# Patient Record
Sex: MALE | Race: WHITE | Employment: OTHER | URBAN - METROPOLITAN AREA
[De-identification: names, ages, dates, MRNs, and addresses within clinical notes are randomized per-mention and may not be internally consistent; named-entity substitution may affect disease eponyms.]

---

## 2023-06-15 ENCOUNTER — TELEPHONE (OUTPATIENT)
Dept: ORTHOPEDIC SURGERY | Age: 68
End: 2023-06-15

## 2023-06-15 NOTE — TELEPHONE ENCOUNTER
General Question     Subject: 2ND OPINION FROM [place]  Patient and /or Facility Request: Chey Woods Number: [phone]      THIS PT IS COMING FOR A 2ND OPINION FROM [place]. THEY ARE FAXING HIS RECORDS AND HE WILL BE BRINGING A DISC OF THE IMAGING WITH HIM. Angel Reyes [phone]. CAN YOU MAIL HIM ANY FORMS OR DIRECTIONS HE NEEDS TO HAVE PRIOR TO THE APPT PLEASE.

## 2023-07-11 ENCOUNTER — OFFICE VISIT (OUTPATIENT)
Dept: ORTHOPEDIC SURGERY | Age: 68
End: 2023-07-11

## 2023-07-11 VITALS — BODY MASS INDEX: 31.1 KG/M2 | WEIGHT: 210 LBS | HEIGHT: 69 IN

## 2023-07-11 DIAGNOSIS — T84.82XD ARTHROFIBROSIS OF TOTAL KNEE ARTHROPLASTY, SUBSEQUENT ENCOUNTER: Primary | ICD-10-CM

## 2023-07-11 RX ORDER — SOTALOL HYDROCHLORIDE 120 MG/1
120 TABLET ORAL 2 TIMES DAILY
COMMUNITY
Start: 2023-06-17

## 2023-07-11 RX ORDER — AMLODIPINE BESYLATE 5 MG/1
5 TABLET ORAL DAILY
COMMUNITY
Start: 2018-12-20

## 2023-07-11 RX ORDER — APIXABAN 5 MG/1
5 TABLET, FILM COATED ORAL 2 TIMES DAILY
COMMUNITY
Start: 2023-06-17

## 2023-07-11 RX ORDER — VALSARTAN AND HYDROCHLOROTHIAZIDE 160; 12.5 MG/1; MG/1
1 TABLET, FILM COATED ORAL DAILY
COMMUNITY

## 2023-07-11 RX ORDER — EVOLOCUMAB 140 MG/ML
INJECTION, SOLUTION SUBCUTANEOUS
COMMUNITY

## 2023-07-11 NOTE — PROGRESS NOTES
options and have  reviewed and agree with the past medical, family and social history unless otherwise noted. All of the patient's questions were answered. I personally supervised the Orthopedic and Sportsmedicine Fellow when when noted in the evaluation and treatment plan of the patient.       Board Certified Orthopaedic Surgeon  [facility] and [facility]  President and [facility]  Professor of [address] [place] [address], [address] [place] [address]

## 2023-07-12 ENCOUNTER — TELEPHONE (OUTPATIENT)
Dept: ORTHOPEDIC SURGERY | Age: 68
End: 2023-07-12

## 2023-07-17 DIAGNOSIS — T84.82XD ARTHROFIBROSIS OF TOTAL KNEE ARTHROPLASTY, SUBSEQUENT ENCOUNTER: Primary | ICD-10-CM

## 2023-07-17 NOTE — TELEPHONE ENCOUNTER
Spoke with patient and he is willing to stay for 6 weeks for PT. Put in for surgical letter. Emailed patient paperwork.

## 2023-07-18 ENCOUNTER — TELEPHONE (OUTPATIENT)
Dept: ORTHOPEDIC SURGERY | Age: 68
End: 2023-07-18

## 2023-08-07 ENCOUNTER — TELEPHONE (OUTPATIENT)
Dept: ORTHOPEDIC SURGERY | Age: 68
End: 2023-08-07

## 2023-08-07 NOTE — TELEPHONE ENCOUNTER
Auth: NPR  Date: 08/28/23  Reference # None  Spoke with: None  Type of SX: Outpatient  Location: [facility]  CPT: 48886   DX: T84.82XS  SX area: Rt knee  Insurance: Medicare A&B

## 2023-08-09 ENCOUNTER — TELEPHONE (OUTPATIENT)
Dept: ORTHOPEDIC SURGERY | Age: 68
End: 2023-08-09

## 2023-08-09 NOTE — TELEPHONE ENCOUNTER
Faxing Patient [address] Name: [address] [place] [address],[address] Name: Lilly De La Rosa  Contact Number: [phone]  Facility Fax Number: [phone]    PT IS REQUESTING CODES TO BE SENT TO [facility] copygram TO BE FORWARDED [facility] TEST TO [address] [place] [address]. PT STATED THAT HE IS ABLE TO PAY FOR CASH FOR THE TESTS EXCEPT FOR THE PTINR-WHICH WOULD HAVE TO BE BILLED THROUGH INSURANCE. THIS WOULD BE THE ONLY CODE VIA PT THAT WOULD NEED DX CODE. PLEASE ADVISE.

## 2023-08-16 ENCOUNTER — TELEPHONE (OUTPATIENT)
Dept: ORTHOPEDIC SURGERY | Age: 68
End: 2023-08-16

## 2023-08-16 NOTE — TELEPHONE ENCOUNTER
Surgery and/or Procedure Scheduling     Contact Name: Amairani Castellanos  Surgical/Procedure Request: Sx on 08/28/23 on his knee  Patient Contact Number: [phone]    Patient call and he would like to get and call back regarding these questions. He would like to know if office got all of his paper work and labs for his surgery. 2. Patient is taken Eliquis and he just need to know if he should stop taken this for a week before his surgery. 3. He also need to know if he should stop taken his Testosterone Supplement he need to know for how long?

## 2023-08-18 NOTE — TELEPHONE ENCOUNTER
General Question     Subject: REQUESTING A CALL REGARDING PRE-OP FORMS IN ORDER AND MEDICATION QUESTIONS FOR SX.   Patient: Rosalba Hampton Number: [phone]

## 2023-08-22 ENCOUNTER — HOSPITAL ENCOUNTER (OUTPATIENT)
Dept: PHYSICAL THERAPY | Age: 68
Setting detail: THERAPIES SERIES
Discharge: HOME | End: 2023-08-22
Payer: MEDICARE

## 2023-08-22 ENCOUNTER — OFFICE VISIT (OUTPATIENT)
Dept: ORTHOPEDIC SURGERY | Age: 68
End: 2023-08-22

## 2023-08-22 VITALS — HEIGHT: 69 IN | BODY MASS INDEX: 31.1 KG/M2 | WEIGHT: 210 LBS

## 2023-08-22 DIAGNOSIS — T84.82XD ARTHROFIBROSIS OF TOTAL KNEE ARTHROPLASTY, SUBSEQUENT ENCOUNTER: Primary | ICD-10-CM

## 2023-08-22 PROCEDURE — PREOPEXAM PRE-OP EXAM: Performed by: PHYSICIAN ASSISTANT

## 2023-08-22 PROCEDURE — 97161 PT EVAL LOW COMPLEX 20 MIN: CPT | Performed by: PHYSICAL THERAPIST

## 2023-08-22 PROCEDURE — 97530 THERAPEUTIC ACTIVITIES: CPT | Performed by: PHYSICAL THERAPIST

## 2023-08-22 NOTE — FLOWSHEET NOTE
[facility] and [address] [place] [address] [address],  [facility], [address] [place] [address]  200 [facility], [address] [place] [address] Avenue  Phone: [phone]  Fax: [phone]      Physical Therapy Daily Treatment Note  Date:  2023    Patient Name:  Benjamin Velazquez    :  1955  MRN: [id number]  Restrictions/Precautions:    Medical/Treatment Diagnosis Information:  Arthrofibrosis of total knee arthroplasty, subsequent encounter [T84.82XD]  Treatment Diagnosis: M25.561 pain in right knee  Insurance/Certification information:  PT Insurance Information: Medicare  Physician Information:  Sammy Hwang   Has the plan of care been signed (Y/N):        []  Yes  [x]  No     Date of Patient follow up with Physician:       Is this a Progress Report:     []  Yes  [x]  No        If Yes:  Date Range for reporting period:  Beginning  Ending    Progress report will be due (10 Rx or 30 days whichever is less):        Recertification will be due (POC Duration  / 90 days whichever is less): 10/3/23         Visit # Insurance Allowable Auth Required   1 medicare []  Yes [x]  No          OUTCOME MEASURE DATE SCORE   LEFS 23 62% deficit            Latex Allergy:  [x]NO      []YES  Preferred Language for Healthcare:   [x]English       []other:    Pain level:  4-6/10     SUBJECTIVE:  See eval    OBJECTIVE:   Observation:   Test measurements:        Flexibility L R Comment   Hamstring         Gastroc         ITB         Quad                                ROM PROM AROM Overpressure Comment     L R L R L R     Flexion       130         Extension       0                                                   Strength L R Comment   Quad   4+/5     Hamstring   4+/5     Gastroc         Hip  flexion   4+/5     Hip abd   4+/5               Quad good good [email or web address]         Special Test Results/Comment   Meniscal Click     Crepitus (+) 90-0 pain throughout movement   Flexion Test     Valgus Laxity

## 2023-08-22 NOTE — PROGRESS NOTES
HAVE 8/25/23 DR Shonda HINKLE    Sleep apnea     CAN;T TOLERATE CPAP    SOB (shortness of breath) on exertion     \"RUNNERS ARRYTHMIA\"        Past Surgical History:   Procedure Laterality Date    BACK SURGERY      LUMBAR, PT ALSO HAD INJECTIONS, INCLUDING STEM CELL,    DENTAL SURGERY      IMPLANTS    JOINT REPLACEMENT      KNEE SURGERY Right     MENISCUS, CLEANED OUT CARTILAGE, AND IMPLANT -3 TOTAL)    SHOULDER ARTHROSCOPY Bilateral     HAS HAD 8 TOTAL SURGERIES FOR SHOULDER, ROTATOR CUFF, TENODESIS    THUMB ARTHROSCOPY      LEFT THUMB FUSION       History reviewed. No pertinent family history.     Social History     Socioeconomic History    Marital status:      Spouse name: None    Number of children: None    Years of education: None    Highest education level: None   Tobacco Use    Smoking status: Never    Smokeless tobacco: Never   Substance and Sexual Activity    Alcohol use: Yes     Comment: WINE OUT TO DINNER, BEER, AWARE NOT TO DRINK BEFORE SURGERY    Drug use: Never       Current Outpatient Medications   Medication Sig Dispense Refill    tadalafil (CIALIS) 20 MG tablet       nitroGLYCERIN (NITROSTAT) 0.4 MG SL tablet PT SAYS HAS, NEVER USED, PRN      methocarbamol (ROBAXIN) 500 MG tablet Take 1 tablet by mouth as needed      diclofenac (VOLTAREN) 75 MG EC tablet Take 1 tablet by mouth as needed      colesevelam (WELCHOL) 625 MG tablet TAKE 3 TABLETS BY MOUTH TWICE DAILY      testosterone cypionate (DEPOTESTOTERONE CYPIONATE) 200 MG/ML injection INJECT 0.5ML INTRAMUSCULARLY EVERY 7 DAYS      aspirin 81 MG EC tablet Take 1 tablet by mouth daily      amLODIPine (NORVASC) 5 MG tablet Take 1 tablet by mouth daily      ELIQUIS 5 MG TABS tablet Take 1 tablet by mouth 2 times daily      valsartan-hydroCHLOROthiazide (DIOVAN-HCT) 160-12.5 MG per tablet Take 1 tablet by mouth daily      Evolocumab (REPATHA SURECLICK) 751 MG/ML SOAJ Repatha SureClick 955 mg/mL subcutaneous pen injector   INJECT 1MG SUBCUTANEOUSLY

## 2023-08-23 ENCOUNTER — TELEPHONE (OUTPATIENT)
Dept: ORTHOPEDIC SURGERY | Age: 68
End: 2023-08-23

## 2023-08-23 RX ORDER — TESTOSTERONE CYPIONATE 200 MG/ML
INJECTION, SOLUTION INTRAMUSCULAR
COMMUNITY
Start: 2023-06-21

## 2023-08-23 RX ORDER — COLESEVELAM 180 1/1
TABLET ORAL
COMMUNITY
Start: 2023-06-21

## 2023-08-23 RX ORDER — NITROGLYCERIN 0.4 MG/1
TABLET SUBLINGUAL
COMMUNITY

## 2023-08-23 RX ORDER — METHOCARBAMOL 500 MG/1
1 TABLET, FILM COATED ORAL AS NEEDED
COMMUNITY
Start: 2019-12-10

## 2023-08-23 RX ORDER — TADALAFIL 20 MG/1
TABLET ORAL
COMMUNITY

## 2023-08-23 RX ORDER — DICLOFENAC SODIUM 75 MG/1
75 TABLET, DELAYED RELEASE ORAL PRN
COMMUNITY
Start: 2019-09-24

## 2023-08-23 RX ORDER — ASPIRIN 81 MG/1
81 TABLET ORAL DAILY
COMMUNITY

## 2023-08-23 NOTE — TELEPHONE ENCOUNTER
Follow patient in the office yesterday for his preoperative visit. Please see that note for further details. All the patient's questions were answered and he agrees with the current plan.         Alfonso Palomo PA-C    Physician Assistant - Certified  [address] [place] [address]    08/23/23 11:02 AM

## 2023-08-23 NOTE — TELEPHONE ENCOUNTER
General Question     Subject: [facility]   Patient and /or Facility Request: [place] Day Number: [phone]    [facility] CALLED IN ABOUT THE PATIENT UPCOMING [facility] ON AUGUST 28, 2023. .. NEED TO KNOW WHAT KINDS OF MEDS HE TAKE. Diana Borden AND ANTIBIOTICS HE TAKING. Diana Borden PLEASE CALL FRITZ BACK FROM [facility]. ..

## 2023-08-25 ENCOUNTER — TELEPHONE (OUTPATIENT)
Dept: ORTHOPEDIC SURGERY | Age: 68
End: 2023-08-25

## 2023-08-25 NOTE — TELEPHONE ENCOUNTER
General Question     Subject: INSURANCE CHANGE  Patient and /or Facility Request: Satya Montgomery Number: [phone]    PT CLLED SAID HIS SECONDARY INSURANCE WAS CHANGED TO [employer]  FOR DRUG COVERAGE  M# [id number]  HE WANTED TO MAKE SURE IT WOULD COVER THE DRUGS USED DURING SURGERY TRANSFERRED TO BILLING

## 2023-08-28 ENCOUNTER — ANESTHESIA (OUTPATIENT)
Dept: OPERATING ROOM | Age: 68
End: 2023-08-28
Payer: MEDICARE

## 2023-08-28 ENCOUNTER — HOSPITAL ENCOUNTER (OUTPATIENT)
Age: 68
Setting detail: OUTPATIENT SURGERY
Discharge: HOME | End: 2023-08-28
Attending: ORTHOPAEDIC SURGERY | Admitting: ORTHOPAEDIC SURGERY
Payer: MEDICARE

## 2023-08-28 ENCOUNTER — ANESTHESIA EVENT (OUTPATIENT)
Dept: OPERATING ROOM | Age: 68
End: 2023-08-28
Payer: MEDICARE

## 2023-08-28 VITALS
WEIGHT: 207 LBS | DIASTOLIC BLOOD PRESSURE: 82 MMHG | HEIGHT: 69 IN | TEMPERATURE: 97.2 F | BODY MASS INDEX: 30.66 KG/M2 | HEART RATE: 60 BPM | RESPIRATION RATE: 16 BRPM | OXYGEN SATURATION: 94 % | SYSTOLIC BLOOD PRESSURE: 132 MMHG

## 2023-08-28 DIAGNOSIS — T84.82XA ARTHROFIBROSIS OF TOTAL KNEE REPLACEMENT, INITIAL ENCOUNTER (HCC): Primary | ICD-10-CM

## 2023-08-28 LAB
APTT BLD: 23.7 SEC (ref 22.7–35.9)
DEPRECATED RDW RBC AUTO: 16.8 % (ref 12.4–15.4)
GLUCOSE BLD-MCNC: 126 MG/DL (ref 70–99)
HCT VFR BLD AUTO: 43.6 % (ref 40.5–52.5)
HGB BLD-MCNC: 15.2 G/DL (ref 13.5–17.5)
INR PPP: 0.99 (ref 0.84–1.16)
MCH RBC QN AUTO: 30.5 PG (ref 26–34)
MCHC RBC AUTO-ENTMCNC: 34.8 G/DL (ref 31–36)
MCV RBC AUTO: 87.7 FL (ref 80–100)
PERFORMED ON: ABNORMAL
PLATELET # BLD AUTO: 142 K/UL (ref 135–450)
PMV BLD AUTO: 8.9 FL (ref 5–10.5)
PROTHROMBIN TIME: 13.1 SEC (ref 11.5–14.8)
RBC # BLD AUTO: 4.97 M/UL (ref 4.2–5.9)
WBC # BLD AUTO: 3.4 K/UL (ref 4–11)

## 2023-08-28 PROCEDURE — 6360000002 HC RX W HCPCS: Performed by: ORTHOPAEDIC SURGERY

## 2023-08-28 PROCEDURE — 7100000011 HC PHASE II RECOVERY - ADDTL 15 MIN: Performed by: ORTHOPAEDIC SURGERY

## 2023-08-28 PROCEDURE — 2500000003 HC RX 250 WO HCPCS: Performed by: ORTHOPAEDIC SURGERY

## 2023-08-28 PROCEDURE — 88305 TISSUE EXAM BY PATHOLOGIST: CPT

## 2023-08-28 PROCEDURE — 87205 SMEAR GRAM STAIN: CPT

## 2023-08-28 PROCEDURE — 87116 MYCOBACTERIA CULTURE: CPT

## 2023-08-28 PROCEDURE — 3700000000 HC ANESTHESIA ATTENDED CARE: Performed by: ORTHOPAEDIC SURGERY

## 2023-08-28 PROCEDURE — 2580000003 HC RX 258: Performed by: ANESTHESIOLOGY

## 2023-08-28 PROCEDURE — 7100000000 HC PACU RECOVERY - FIRST 15 MIN: Performed by: ORTHOPAEDIC SURGERY

## 2023-08-28 PROCEDURE — 2580000003 HC RX 258: Performed by: STUDENT IN AN ORGANIZED HEALTH CARE EDUCATION/TRAINING PROGRAM

## 2023-08-28 PROCEDURE — 2709999900 HC NON-CHARGEABLE SUPPLY: Performed by: ORTHOPAEDIC SURGERY

## 2023-08-28 PROCEDURE — 3700000001 HC ADD 15 MINUTES (ANESTHESIA): Performed by: ORTHOPAEDIC SURGERY

## 2023-08-28 PROCEDURE — 87206 SMEAR FLUORESCENT/ACID STAI: CPT

## 2023-08-28 PROCEDURE — 2580000003 HC RX 258: Performed by: ORTHOPAEDIC SURGERY

## 2023-08-28 PROCEDURE — 85610 PROTHROMBIN TIME: CPT

## 2023-08-28 PROCEDURE — 87075 CULTR BACTERIA EXCEPT BLOOD: CPT

## 2023-08-28 PROCEDURE — 85730 THROMBOPLASTIN TIME PARTIAL: CPT

## 2023-08-28 PROCEDURE — 87070 CULTURE OTHR SPECIMN AEROBIC: CPT

## 2023-08-28 PROCEDURE — 3600000014 HC SURGERY LEVEL 4 ADDTL 15MIN: Performed by: ORTHOPAEDIC SURGERY

## 2023-08-28 PROCEDURE — C9399 UNCLASSIFIED DRUGS OR BIOLOG: HCPCS | Performed by: STUDENT IN AN ORGANIZED HEALTH CARE EDUCATION/TRAINING PROGRAM

## 2023-08-28 PROCEDURE — 3600000004 HC SURGERY LEVEL 4 BASE: Performed by: ORTHOPAEDIC SURGERY

## 2023-08-28 PROCEDURE — 2500000003 HC RX 250 WO HCPCS: Performed by: STUDENT IN AN ORGANIZED HEALTH CARE EDUCATION/TRAINING PROGRAM

## 2023-08-28 PROCEDURE — 85027 COMPLETE CBC AUTOMATED: CPT

## 2023-08-28 PROCEDURE — 6360000002 HC RX W HCPCS: Performed by: STUDENT IN AN ORGANIZED HEALTH CARE EDUCATION/TRAINING PROGRAM

## 2023-08-28 PROCEDURE — 7100000001 HC PACU RECOVERY - ADDTL 15 MIN: Performed by: ORTHOPAEDIC SURGERY

## 2023-08-28 PROCEDURE — 7100000010 HC PHASE II RECOVERY - FIRST 15 MIN: Performed by: ORTHOPAEDIC SURGERY

## 2023-08-28 RX ORDER — DEXAMETHASONE SODIUM PHOSPHATE 4 MG/ML
INJECTION, SOLUTION INTRA-ARTICULAR; INTRALESIONAL; INTRAMUSCULAR; INTRAVENOUS; SOFT TISSUE PRN
Status: DISCONTINUED | OUTPATIENT
Start: 2023-08-28 | End: 2023-08-28 | Stop reason: SDUPTHER

## 2023-08-28 RX ORDER — KETAMINE HCL IN NACL, ISO-OSM 20 MG/2 ML
SYRINGE (ML) INJECTION PRN
Status: DISCONTINUED | OUTPATIENT
Start: 2023-08-28 | End: 2023-08-28 | Stop reason: SDUPTHER

## 2023-08-28 RX ORDER — SODIUM CHLORIDE 0.9 % (FLUSH) 0.9 %
5-40 SYRINGE (ML) INJECTION PRN
Status: CANCELLED | OUTPATIENT
Start: 2023-08-28

## 2023-08-28 RX ORDER — ROCURONIUM BROMIDE 10 MG/ML
INJECTION, SOLUTION INTRAVENOUS PRN
Status: DISCONTINUED | OUTPATIENT
Start: 2023-08-28 | End: 2023-08-28 | Stop reason: SDUPTHER

## 2023-08-28 RX ORDER — VANCOMYCIN HYDROCHLORIDE 1 G/20ML
INJECTION, POWDER, LYOPHILIZED, FOR SOLUTION INTRAVENOUS PRN
Status: DISCONTINUED | OUTPATIENT
Start: 2023-08-28 | End: 2023-08-28 | Stop reason: HOSPADM

## 2023-08-28 RX ORDER — SODIUM CHLORIDE, SODIUM LACTATE, POTASSIUM CHLORIDE, CALCIUM CHLORIDE 600; 310; 30; 20 MG/100ML; MG/100ML; MG/100ML; MG/100ML
INJECTION, SOLUTION INTRAVENOUS CONTINUOUS
Status: DISCONTINUED | OUTPATIENT
Start: 2023-08-28 | End: 2023-08-28 | Stop reason: HOSPADM

## 2023-08-28 RX ORDER — SODIUM CHLORIDE, SODIUM LACTATE, POTASSIUM CHLORIDE, AND CALCIUM CHLORIDE .6; .31; .03; .02 G/100ML; G/100ML; G/100ML; G/100ML
IRRIGANT IRRIGATION PRN
Status: DISCONTINUED | OUTPATIENT
Start: 2023-08-28 | End: 2023-08-28 | Stop reason: ALTCHOICE

## 2023-08-28 RX ORDER — HYDROCODONE BITARTRATE AND ACETAMINOPHEN 5; 325 MG/1; MG/1
1 TABLET ORAL EVERY 6 HOURS PRN
Qty: 28 TABLET | Refills: 0 | Status: SHIPPED | OUTPATIENT
Start: 2023-08-28 | End: 2023-09-04

## 2023-08-28 RX ORDER — PROCHLORPERAZINE EDISYLATE 5 MG/ML
5 INJECTION INTRAMUSCULAR; INTRAVENOUS
Status: CANCELLED | OUTPATIENT
Start: 2023-08-28 | End: 2023-08-29

## 2023-08-28 RX ORDER — SODIUM CHLORIDE 9 MG/ML
INJECTION, SOLUTION INTRAVENOUS PRN
Status: CANCELLED | OUTPATIENT
Start: 2023-08-28

## 2023-08-28 RX ORDER — FENTANYL CITRATE 50 UG/ML
25 INJECTION, SOLUTION INTRAMUSCULAR; INTRAVENOUS EVERY 5 MIN PRN
Status: CANCELLED | OUTPATIENT
Start: 2023-08-28

## 2023-08-28 RX ORDER — ACETAMINOPHEN 325 MG/1
650 TABLET ORAL
Status: CANCELLED | OUTPATIENT
Start: 2023-08-28 | End: 2023-08-29

## 2023-08-28 RX ORDER — ROPIVACAINE HYDROCHLORIDE 5 MG/ML
INJECTION, SOLUTION EPIDURAL; INFILTRATION; PERINEURAL PRN
Status: DISCONTINUED | OUTPATIENT
Start: 2023-08-28 | End: 2023-08-28 | Stop reason: HOSPADM

## 2023-08-28 RX ORDER — MIDAZOLAM HYDROCHLORIDE 1 MG/ML
INJECTION INTRAMUSCULAR; INTRAVENOUS PRN
Status: DISCONTINUED | OUTPATIENT
Start: 2023-08-28 | End: 2023-08-28 | Stop reason: SDUPTHER

## 2023-08-28 RX ORDER — SENNOSIDES 8.6 MG
1 TABLET ORAL DAILY
Qty: 30 TABLET | Refills: 0 | Status: SHIPPED | OUTPATIENT
Start: 2023-08-28

## 2023-08-28 RX ORDER — HYDROMORPHONE HYDROCHLORIDE 1 MG/ML
0.5 INJECTION, SOLUTION INTRAMUSCULAR; INTRAVENOUS; SUBCUTANEOUS EVERY 5 MIN PRN
Status: CANCELLED | OUTPATIENT
Start: 2023-08-28

## 2023-08-28 RX ORDER — SODIUM CHLORIDE 0.9 % (FLUSH) 0.9 %
5-40 SYRINGE (ML) INJECTION EVERY 12 HOURS SCHEDULED
Status: CANCELLED | OUTPATIENT
Start: 2023-08-28

## 2023-08-28 RX ORDER — LABETALOL HYDROCHLORIDE 5 MG/ML
10 INJECTION, SOLUTION INTRAVENOUS
Status: CANCELLED | OUTPATIENT
Start: 2023-08-28

## 2023-08-28 RX ORDER — LIDOCAINE HYDROCHLORIDE 20 MG/ML
INJECTION, SOLUTION INTRAVENOUS PRN
Status: DISCONTINUED | OUTPATIENT
Start: 2023-08-28 | End: 2023-08-28 | Stop reason: SDUPTHER

## 2023-08-28 RX ORDER — FENTANYL CITRATE 50 UG/ML
INJECTION, SOLUTION INTRAMUSCULAR; INTRAVENOUS PRN
Status: DISCONTINUED | OUTPATIENT
Start: 2023-08-28 | End: 2023-08-28 | Stop reason: SDUPTHER

## 2023-08-28 RX ORDER — PROPOFOL 10 MG/ML
INJECTION, EMULSION INTRAVENOUS PRN
Status: DISCONTINUED | OUTPATIENT
Start: 2023-08-28 | End: 2023-08-28 | Stop reason: SDUPTHER

## 2023-08-28 RX ORDER — ONDANSETRON 4 MG/1
4 TABLET, FILM COATED ORAL 3 TIMES DAILY PRN
Qty: 15 TABLET | Refills: 0 | Status: SHIPPED | OUTPATIENT
Start: 2023-08-28

## 2023-08-28 RX ORDER — GLYCOPYRROLATE 0.2 MG/ML
INJECTION INTRAMUSCULAR; INTRAVENOUS PRN
Status: DISCONTINUED | OUTPATIENT
Start: 2023-08-28 | End: 2023-08-28 | Stop reason: SDUPTHER

## 2023-08-28 RX ORDER — IPRATROPIUM BROMIDE AND ALBUTEROL SULFATE 2.5; .5 MG/3ML; MG/3ML
1 SOLUTION RESPIRATORY (INHALATION)
Status: CANCELLED | OUTPATIENT
Start: 2023-08-28 | End: 2023-08-29

## 2023-08-28 RX ORDER — LIDOCAINE HYDROCHLORIDE 10 MG/ML
1 INJECTION, SOLUTION EPIDURAL; INFILTRATION; INTRACAUDAL; PERINEURAL
Status: DISCONTINUED | OUTPATIENT
Start: 2023-08-28 | End: 2023-08-28 | Stop reason: HOSPADM

## 2023-08-28 RX ORDER — SODIUM CHLORIDE 0.9 % (FLUSH) 0.9 %
5-40 SYRINGE (ML) INJECTION PRN
Status: DISCONTINUED | OUTPATIENT
Start: 2023-08-28 | End: 2023-08-28 | Stop reason: HOSPADM

## 2023-08-28 RX ORDER — ONDANSETRON 2 MG/ML
4 INJECTION INTRAMUSCULAR; INTRAVENOUS
Status: CANCELLED | OUTPATIENT
Start: 2023-08-28 | End: 2023-08-29

## 2023-08-28 RX ORDER — HYDROMORPHONE HYDROCHLORIDE 2 MG/ML
INJECTION, SOLUTION INTRAMUSCULAR; INTRAVENOUS; SUBCUTANEOUS PRN
Status: DISCONTINUED | OUTPATIENT
Start: 2023-08-28 | End: 2023-08-28 | Stop reason: SDUPTHER

## 2023-08-28 RX ORDER — ONDANSETRON 2 MG/ML
INJECTION INTRAMUSCULAR; INTRAVENOUS PRN
Status: DISCONTINUED | OUTPATIENT
Start: 2023-08-28 | End: 2023-08-28 | Stop reason: SDUPTHER

## 2023-08-28 RX ORDER — SODIUM CHLORIDE 0.9 % (FLUSH) 0.9 %
5-40 SYRINGE (ML) INJECTION EVERY 12 HOURS SCHEDULED
Status: DISCONTINUED | OUTPATIENT
Start: 2023-08-28 | End: 2023-08-28 | Stop reason: HOSPADM

## 2023-08-28 RX ORDER — SUCCINYLCHOLINE/SOD CL,ISO/PF 200MG/10ML
SYRINGE (ML) INTRAVENOUS PRN
Status: DISCONTINUED | OUTPATIENT
Start: 2023-08-28 | End: 2023-08-28 | Stop reason: SDUPTHER

## 2023-08-28 RX ADMIN — PROPOFOL 20 MG: 10 INJECTION, EMULSION INTRAVENOUS at 13:04

## 2023-08-28 RX ADMIN — PROPOFOL 20 MG: 10 INJECTION, EMULSION INTRAVENOUS at 13:00

## 2023-08-28 RX ADMIN — PROPOFOL 20 MG: 10 INJECTION, EMULSION INTRAVENOUS at 12:48

## 2023-08-28 RX ADMIN — PROPOFOL 30 MG: 10 INJECTION, EMULSION INTRAVENOUS at 12:39

## 2023-08-28 RX ADMIN — ONDANSETRON 4 MG: 2 INJECTION INTRAMUSCULAR; INTRAVENOUS at 11:30

## 2023-08-28 RX ADMIN — LIDOCAINE HYDROCHLORIDE 100 MG: 20 INJECTION, SOLUTION INTRAVENOUS at 12:39

## 2023-08-28 RX ADMIN — PROPOFOL 20 MG: 10 INJECTION, EMULSION INTRAVENOUS at 12:46

## 2023-08-28 RX ADMIN — Medication 20 MG: at 11:22

## 2023-08-28 RX ADMIN — SUGAMMADEX 200 MG: 100 INJECTION, SOLUTION INTRAVENOUS at 12:39

## 2023-08-28 RX ADMIN — PROPOFOL 30 MG: 10 INJECTION, EMULSION INTRAVENOUS at 13:14

## 2023-08-28 RX ADMIN — TRANEXAMIC ACID 1000 MG: 100 INJECTION, SOLUTION INTRAVENOUS at 12:21

## 2023-08-28 RX ADMIN — PROPOFOL 20 MG: 10 INJECTION, EMULSION INTRAVENOUS at 12:52

## 2023-08-28 RX ADMIN — SODIUM CHLORIDE, POTASSIUM CHLORIDE, SODIUM LACTATE AND CALCIUM CHLORIDE: 600; 310; 30; 20 INJECTION, SOLUTION INTRAVENOUS at 09:40

## 2023-08-28 RX ADMIN — PROPOFOL 150 MG: 10 INJECTION, EMULSION INTRAVENOUS at 11:22

## 2023-08-28 RX ADMIN — HYDROMORPHONE HYDROCHLORIDE 1 MG: 2 INJECTION, SOLUTION INTRAMUSCULAR; INTRAVENOUS; SUBCUTANEOUS at 12:51

## 2023-08-28 RX ADMIN — ROCURONIUM BROMIDE 50 MG: 10 INJECTION, SOLUTION INTRAVENOUS at 11:30

## 2023-08-28 RX ADMIN — SODIUM CHLORIDE, POTASSIUM CHLORIDE, SODIUM LACTATE AND CALCIUM CHLORIDE: 600; 310; 30; 20 INJECTION, SOLUTION INTRAVENOUS at 11:47

## 2023-08-28 RX ADMIN — PROPOFOL 100 MG: 10 INJECTION, EMULSION INTRAVENOUS at 12:43

## 2023-08-28 RX ADMIN — FENTANYL CITRATE 50 MCG: 50 INJECTION, SOLUTION INTRAMUSCULAR; INTRAVENOUS at 12:18

## 2023-08-28 RX ADMIN — CEFAZOLIN 2000 MG: 2 INJECTION, POWDER, FOR SOLUTION INTRAMUSCULAR; INTRAVENOUS at 11:30

## 2023-08-28 RX ADMIN — PROPOFOL 20 MG: 10 INJECTION, EMULSION INTRAVENOUS at 13:16

## 2023-08-28 RX ADMIN — DEXAMETHASONE SODIUM PHOSPHATE 10 MG: 4 INJECTION, SOLUTION INTRAMUSCULAR; INTRAVENOUS at 11:30

## 2023-08-28 RX ADMIN — PROPOFOL 20 MG: 10 INJECTION, EMULSION INTRAVENOUS at 12:56

## 2023-08-28 RX ADMIN — PROPOFOL 10 MG: 10 INJECTION, EMULSION INTRAVENOUS at 12:58

## 2023-08-28 RX ADMIN — Medication 160 MG: at 11:22

## 2023-08-28 RX ADMIN — PROPOFOL 10 MG: 10 INJECTION, EMULSION INTRAVENOUS at 13:02

## 2023-08-28 RX ADMIN — MIDAZOLAM HYDROCHLORIDE 4 MG: 2 INJECTION, SOLUTION INTRAMUSCULAR; INTRAVENOUS at 11:18

## 2023-08-28 RX ADMIN — GLYCOPYRROLATE 0.2 MG: 0.2 INJECTION INTRAMUSCULAR; INTRAVENOUS at 11:30

## 2023-08-28 RX ADMIN — LIDOCAINE HYDROCHLORIDE 100 MG: 20 INJECTION, SOLUTION INTRAVENOUS at 11:22

## 2023-08-28 RX ADMIN — FENTANYL CITRATE 50 MCG: 50 INJECTION, SOLUTION INTRAMUSCULAR; INTRAVENOUS at 11:22

## 2023-08-28 RX ADMIN — PROPOFOL 20 MG: 10 INJECTION, EMULSION INTRAVENOUS at 13:07

## 2023-08-28 RX ADMIN — PROPOFOL 30 MG: 10 INJECTION, EMULSION INTRAVENOUS at 13:10

## 2023-08-28 ASSESSMENT — ENCOUNTER SYMPTOMS: SHORTNESS OF BREATH: 1

## 2023-08-28 ASSESSMENT — PAIN SCALES - GENERAL
PAINLEVEL_OUTOF10: 0
PAINLEVEL_OUTOF10: 2
PAINLEVEL_OUTOF10: 0

## 2023-08-28 NOTE — ANESTHESIA POSTPROCEDURE EVALUATION
Department of Anesthesiology  Postprocedure Note    Patient: Benjamin Velazquez  MRN: [id number]  YOB: 1955  Date of evaluation: 8/28/2023      Procedure Summary     Date: 08/28/23 Room / Location: [address] [place] [address] / [address]    Anesthesia Start: 1118 Anesthesia Stop: 5522    Procedure: RIGHT ANTERIOR KNEE  SYNOVECTOMY, OPEN EXTENSOR MECHANISM REALIGNMENT (Right: Knee) Diagnosis:       Arthrofibrosis of total knee replacement, initial encounter ([address])      (Arthrofibrosis of total knee replacement, initial encounter ([address]) [P10.37UP])    Surgeons: Sammy Hwang MD Responsible Provider: Brittaney Fox MD    Anesthesia Type: general ASA Status: 3          Anesthesia Type: No value filed.     Kristie Phase I: Kristie Score: 10    Kristie Phase II:        Anesthesia Post Evaluation    Patient location during evaluation: PACU  Patient participation: complete - patient participated  Level of consciousness: awake  Pain score: 0  Nausea & Vomiting: no nausea and no vomiting  Complications: no  Cardiovascular status: blood pressure returned to baseline  Respiratory status: acceptable  Hydration status: euvolemic  Pain management: adequate

## 2023-08-29 ENCOUNTER — HOSPITAL ENCOUNTER (OUTPATIENT)
Dept: PHYSICAL THERAPY | Age: 68
Discharge: HOME | End: 2023-08-29
Payer: MEDICARE

## 2023-08-29 LAB
ACID FAST STN SPEC QL: NORMAL
ACID FAST STN SPEC QL: NORMAL

## 2023-08-29 PROCEDURE — 97112 NEUROMUSCULAR REEDUCATION: CPT | Performed by: PHYSICAL THERAPIST

## 2023-08-29 PROCEDURE — 97530 THERAPEUTIC ACTIVITIES: CPT | Performed by: PHYSICAL THERAPIST

## 2023-08-29 PROCEDURE — 97110 THERAPEUTIC EXERCISES: CPT | Performed by: PHYSICAL THERAPIST

## 2023-08-29 PROCEDURE — 97016 VASOPNEUMATIC DEVICE THERAPY: CPT | Performed by: PHYSICAL THERAPIST

## 2023-08-29 NOTE — OP NOTE
[facility], [address] [place] [address]                                OPERATIVE REPORT    PATIENT NAME: Dhiraj Campo                       :        1955  MED REC NO:   [id number]                          ROOM:  ACCOUNT NO:   [de-identified]                           ADMIT DATE: 2023  PROVIDER:     Didi Aranda MD    DATE OF PROCEDURE:  2023    PREOPERATIVE DIAGNOSIS:  Arthrofibrosis, right knee, status post tibial  femoral unicompartmental arthroplasty. OPERATIVE PROCEDURES:  1. Anterior open synovectomy, right knee (CPT code 10727). 2.  Extensor mechanism realignment medial and lateral retinacular  releases and MPFL repair, right knee. CPT code 12949.  3.  Manipulation, right knee. CPT [id number]. SURGEON:  Didi Aranda MD    FIRST ASSISTANT:  MARY ANNE Stover    SECOND ASSISTANT:  Otto Welch MD    ANESTHESIA:  General with local ropivacaine block. OPERATIVE INDICATIONS:  This patient did undergo a prior  unicompartmental arthroplasty elsewhere and was referred to our center  with a restriction of motion to 90 degrees, complaints of pain,  stiffness and affecting all activities of daily living and having  undergone a rehabilitation program that did not result in relieving the  patient's symptoms. He was seen on outpatient basis, extensive  consultation, understood the associated risks, benefits and consented to  the operative procedure. OPERATIVE FINDINGS:  Extensively bound down medial and lateral  retinacular release including MPFL requiring large releases and complete  resetting of the extensor mechanism realignment, which proceeded well  without complication. Extensive scar tissue requiring the anterior  removal of scar tissue of synovium mediolateral and particularly  anterior interval completely scarred down limiting patellar mobility.    At the conclusion of these procedures, flexion was obtained to

## 2023-08-29 NOTE — FLOWSHEET NOTE
manage a flight of stairs with normal gait without AD/pain or dysfunction. [] Progressing: [] Met: [] Not Met: [] Adjusted                     Overall Progression Towards Functional goals/ Treatment Progress Update:  [] Patient is progressing as expected towards functional goals listed. [] Progression is slowed due to complexities/Impairments listed. [] Progression has been slowed due to co-morbidities. [x] Plan just implemented, too soon to assess goals progression <30days   [] Goals require adjustment due to lack of progress  [] Patient is not progressing as expected and requires additional follow up with physician  [] Other    Prognosis for POC: [x] Good [] Fair  [] Poor      Patient requires continued skilled intervention: [x] Yes  [] No    Treatment/Activity Tolerance:  [x] Patient able to complete treatment  [] Patient limited by fatigue  [] Patient limited by pain     [] Patient limited by other medical complications  [x] Other: Patient comes into clinic today 1 day s/p right knee SAIMA/medial-lateral retinacular release/CHASIDY. Significant diffuse edema was present just lateral to the patella today. This was aspirated by the PA. We used double compression including a lateral buttress to manage edema. Patella mobility was normal today. ROM is progressing as expected. Quad control was reasonable as well. Post-op instructions were provided including surgical precautions. Pt verbalized good understanding of all instructions. Pt lives in [place], however intends to continue his therapy with us per Dr Elvia oLpez request.        PLAN: See eval  [] Continue per plan of care [] Alter current plan (see comments above)  [x] Plan of care initiated [] Hold pending MD visit [] Discharge  3x/week for 6 weeks.       Electronically signed by:  Delmar Craig PT    Note: If patient does not return for scheduled/ recommended follow up visits, this note will serve as a discharge from care along with most recent update on

## 2023-08-31 ENCOUNTER — HOSPITAL ENCOUNTER (OUTPATIENT)
Dept: PHYSICAL THERAPY | Age: 68
Setting detail: THERAPIES SERIES
Discharge: HOME | End: 2023-08-31
Payer: MEDICARE

## 2023-08-31 PROCEDURE — 97530 THERAPEUTIC ACTIVITIES: CPT | Performed by: PHYSICAL THERAPY ASSISTANT

## 2023-08-31 PROCEDURE — 97140 MANUAL THERAPY 1/> REGIONS: CPT | Performed by: PHYSICAL THERAPY ASSISTANT

## 2023-08-31 PROCEDURE — 97110 THERAPEUTIC EXERCISES: CPT | Performed by: PHYSICAL THERAPY ASSISTANT

## 2023-08-31 PROCEDURE — 97016 VASOPNEUMATIC DEVICE THERAPY: CPT | Performed by: PHYSICAL THERAPY ASSISTANT

## 2023-08-31 NOTE — FLOWSHEET NOTE
[facility] and [address] [place] [address] [address],  [facility], [address] [place] [address]  200 [facility], [address] [place] [address] Avenue  Phone: [phone]  Fax: [phone]      Physical Therapy Daily Treatment Note  Date:  2023    Patient Name:  Claribel Yan    :  1955  MRN: [id number]  Restrictions/Precautions:    Medical/Treatment Diagnosis Information:  Arthrofibrosis of total knee arthroplasty, subsequent encounter [T84.82XD]  Treatment Diagnosis: M25.561 pain in right knee  Right knee SAIMA/medial-lateral retinacular release/CHASIDY 5/10/64  Insurance/Certification information:  PT Insurance Information: Medicare  Physician Information:  Lali Sebastian   Has the plan of care been signed (Y/N):        []  Yes  [x]  No     Date of Patient follow up with Physician:     Patient seen in consultation with Dr. Junaid Antoine who established the initial/subsequent treatment protocol. 23: seen by Katai Calderon: aspiration performed today. Pt to hold on Eliquis for 2 days. Use double compression for edema control. Limit ROM to approx. 90 degrees for 2 weeks. Full ROM by 4 weeks. 23: seen by Dr Jordan Nicole to resume Eliquis. Continue using double compression for edema control. Is this a Progress Report:     []  Yes  [x]  No        If Yes:  Date Range for reporting period:  Beginning  Ending    Progress report will be due (10 Rx or 30 days whichever is less):        Recertification will be due (POC Duration  / 90 days whichever is less): 10/3/23         Visit # Insurance Allowable Auth Required   3 medicare []  Yes [x]  No          OUTCOME MEASURE DATE SCORE   LEFS 23 62% deficit   LEFS 23 86% deficit       Latex Allergy:  [x]NO      []YES  Preferred Language for Healthcare:   [x]English       []other:    Pain level:  3/10     SUBJECTIVE:  Patient comes into clinic today 3 days s/p right knee SAIMA/medial-lateral retinacular release/CHASIDY.   Pt notes that

## 2023-09-02 LAB
BACTERIA SPEC AEROBE CULT: NORMAL
BACTERIA SPEC AEROBE CULT: NORMAL
BACTERIA SPEC ANAEROBE CULT: NORMAL
BACTERIA SPEC ANAEROBE CULT: NORMAL
GRAM STN SPEC: NORMAL
GRAM STN SPEC: NORMAL

## 2023-09-05 ENCOUNTER — OFFICE VISIT (OUTPATIENT)
Dept: ORTHOPEDIC SURGERY | Age: 68
End: 2023-09-05

## 2023-09-05 ENCOUNTER — HOSPITAL ENCOUNTER (OUTPATIENT)
Dept: PHYSICAL THERAPY | Age: 68
Setting detail: THERAPIES SERIES
Discharge: HOME | End: 2023-09-05
Payer: MEDICARE

## 2023-09-05 VITALS — WEIGHT: 207 LBS | HEIGHT: 69 IN | BODY MASS INDEX: 30.66 KG/M2

## 2023-09-05 DIAGNOSIS — G89.18 POST-OPERATIVE PAIN: ICD-10-CM

## 2023-09-05 DIAGNOSIS — R09.89 HOMANS SIGN PRESENT: ICD-10-CM

## 2023-09-05 DIAGNOSIS — M79.89 CALF SWELLING: ICD-10-CM

## 2023-09-05 DIAGNOSIS — M25.461 SWELLING OF RIGHT KNEE JOINT: ICD-10-CM

## 2023-09-05 DIAGNOSIS — T84.82XD ARTHROFIBROSIS OF TOTAL KNEE ARTHROPLASTY, SUBSEQUENT ENCOUNTER: Primary | ICD-10-CM

## 2023-09-05 PROCEDURE — 97140 MANUAL THERAPY 1/> REGIONS: CPT | Performed by: PHYSICAL THERAPIST

## 2023-09-05 PROCEDURE — 97110 THERAPEUTIC EXERCISES: CPT | Performed by: PHYSICAL THERAPIST

## 2023-09-05 PROCEDURE — 97530 THERAPEUTIC ACTIVITIES: CPT | Performed by: PHYSICAL THERAPIST

## 2023-09-05 PROCEDURE — 97016 VASOPNEUMATIC DEVICE THERAPY: CPT | Performed by: PHYSICAL THERAPIST

## 2023-09-05 RX ORDER — OXYCODONE HYDROCHLORIDE 5 MG/1
5 TABLET ORAL EVERY 4 HOURS PRN
Qty: 30 TABLET | Refills: 0 | Status: SHIPPED | OUTPATIENT
Start: 2023-09-05 | End: 2023-09-10

## 2023-09-05 RX ORDER — LIDOCAINE HYDROCHLORIDE 10 MG/ML
3 INJECTION, SOLUTION EPIDURAL; INFILTRATION; INTRACAUDAL; PERINEURAL ONCE
Status: COMPLETED | OUTPATIENT
Start: 2023-09-05 | End: 2023-09-05

## 2023-09-05 RX ORDER — OXYCODONE HYDROCHLORIDE 5 MG/1
5 TABLET ORAL EVERY 6 HOURS PRN
Qty: 20 TABLET | Refills: 0 | Status: CANCELLED | OUTPATIENT
Start: 2023-09-05 | End: 2023-09-10

## 2023-09-05 RX ADMIN — LIDOCAINE HYDROCHLORIDE 3 ML: 10 INJECTION, SOLUTION EPIDURAL; INFILTRATION; INTRACAUDAL; PERINEURAL at 15:53

## 2023-09-05 NOTE — FLOWSHEET NOTE
Discharge  3x/week for 6 weeks. Electronically signed by:  Lyn Gold PT    Note: If patient does not return for scheduled/ recommended follow up visits, this note will serve as a discharge from care along with most recent update on progress.

## 2023-09-05 NOTE — PROGRESS NOTES
Chief Complaint    Follow-up (Right knee. S/p sx 08/28/2023)      History of Present Illness:  Zari Pinedo is a 79 y.o. male who presents for follow up of right knee(s). Patient had right anterior synovectomy and extensor mechanism realignment done on 8/28/2023. Patient has history of medial unicompartmental knee arthroplasty done in December 8, 2022 in [place]. Since then he is struggled with patellofemoral pain. He presents today with knee pain and hematoma of his knee. Patient takes Eliquis for recurrent pulmonary emboli. He had an aspiration done in clinic on 8/29/2023. Past Medical History:   Diagnosis Date    A-fib [facility])     Arthritis     CAD (coronary artery disease)     PT SAYS HAS BLOCKAGES, NEVER STENTED    Chronic prostatitis     Diabetes mellitus ([address])     PRE    Heart disease     Hx of blood clots     4 CLOTS LUNG & LEG    Hyperlipidemia     Hypertension     Polycythemia     DOES PHLEBOTOMY EVERY MONTH & BEFORE SURGERIES, PT SCHEDULED TO HAVE 8/25/23 DR Jose MOEUC    Sleep apnea     CAN;T TOLERATE CPAP    SOB (shortness of breath) on exertion     \"RUNNERS ARRYTHMIA\"        Past Surgical History:   Procedure Laterality Date    BACK SURGERY      LUMBAR, PT ALSO HAD INJECTIONS, INCLUDING STEM CELL,    DENTAL SURGERY      IMPLANTS    JOINT REPLACEMENT      KNEE ARTHROTOMY Right 8/28/2023    RIGHT ANTERIOR KNEE  SYNOVECTOMY, OPEN EXTENSOR MECHANISM REALIGNMENT performed by Negrita Montoya MD at [address] Right     MENISCUS, CLEANED OUT CARTILAGE, AND IMPLANT -3 TOTAL)    SHOULDER ARTHROSCOPY Bilateral     HAS HAD 8 TOTAL SURGERIES FOR SHOULDER, ROTATOR CUFF, TENODESIS    THUMB ARTHROSCOPY      LEFT THUMB FUSION       History reviewed. No pertinent family history.     Social History     Socioeconomic History    Marital status:      Spouse name: None    Number of children: None    Years of education: None    Highest education level: None   Tobacco Use    Smoking status:

## 2023-09-06 ENCOUNTER — HOSPITAL ENCOUNTER (OUTPATIENT)
Dept: PHYSICAL THERAPY | Age: 68
Setting detail: THERAPIES SERIES
Discharge: HOME | End: 2023-09-06
Payer: MEDICARE

## 2023-09-06 PROCEDURE — 97140 MANUAL THERAPY 1/> REGIONS: CPT | Performed by: PHYSICAL THERAPY ASSISTANT

## 2023-09-06 PROCEDURE — 97110 THERAPEUTIC EXERCISES: CPT | Performed by: PHYSICAL THERAPY ASSISTANT

## 2023-09-06 PROCEDURE — G0283 ELEC STIM OTHER THAN WOUND: HCPCS | Performed by: PHYSICAL THERAPY ASSISTANT

## 2023-09-06 PROCEDURE — 97016 VASOPNEUMATIC DEVICE THERAPY: CPT | Performed by: PHYSICAL THERAPY ASSISTANT

## 2023-09-08 ENCOUNTER — HOSPITAL ENCOUNTER (OUTPATIENT)
Dept: PHYSICAL THERAPY | Age: 68
Setting detail: THERAPIES SERIES
Discharge: HOME | End: 2023-09-08
Payer: MEDICARE

## 2023-09-08 DIAGNOSIS — M79.89 CALF SWELLING: ICD-10-CM

## 2023-09-08 DIAGNOSIS — T84.82XD ARTHROFIBROSIS OF TOTAL KNEE ARTHROPLASTY, SUBSEQUENT ENCOUNTER: ICD-10-CM

## 2023-09-08 DIAGNOSIS — G89.18 POST-OPERATIVE PAIN: ICD-10-CM

## 2023-09-08 DIAGNOSIS — M25.461 SWELLING OF RIGHT KNEE JOINT: ICD-10-CM

## 2023-09-08 DIAGNOSIS — R09.89 HOMANS SIGN PRESENT: ICD-10-CM

## 2023-09-08 PROCEDURE — 97110 THERAPEUTIC EXERCISES: CPT | Performed by: PHYSICAL THERAPIST

## 2023-09-08 PROCEDURE — G0283 ELEC STIM OTHER THAN WOUND: HCPCS | Performed by: PHYSICAL THERAPIST

## 2023-09-08 PROCEDURE — 97016 VASOPNEUMATIC DEVICE THERAPY: CPT | Performed by: PHYSICAL THERAPIST

## 2023-09-08 PROCEDURE — 97140 MANUAL THERAPY 1/> REGIONS: CPT | Performed by: PHYSICAL THERAPIST

## 2023-09-11 ENCOUNTER — HOSPITAL ENCOUNTER (OUTPATIENT)
Dept: PHYSICAL THERAPY | Age: 68
Setting detail: THERAPIES SERIES
Discharge: HOME | End: 2023-09-11
Payer: MEDICARE

## 2023-09-11 ENCOUNTER — HOSPITAL ENCOUNTER (OUTPATIENT)
Dept: VASCULAR LAB | Age: 68
Discharge: HOME | End: 2023-09-11
Payer: MEDICARE

## 2023-09-11 ENCOUNTER — TELEPHONE (OUTPATIENT)
Dept: ORTHOPEDIC SURGERY | Age: 68
End: 2023-09-11

## 2023-09-11 DIAGNOSIS — M25.461 SWELLING OF RIGHT KNEE JOINT: ICD-10-CM

## 2023-09-11 DIAGNOSIS — R09.89 HOMANS SIGN PRESENT: ICD-10-CM

## 2023-09-11 DIAGNOSIS — M79.89 CALF SWELLING: ICD-10-CM

## 2023-09-11 DIAGNOSIS — T84.82XD ARTHROFIBROSIS OF TOTAL KNEE ARTHROPLASTY, SUBSEQUENT ENCOUNTER: ICD-10-CM

## 2023-09-11 PROCEDURE — 97140 MANUAL THERAPY 1/> REGIONS: CPT | Performed by: PHYSICAL THERAPIST

## 2023-09-11 PROCEDURE — 93971 EXTREMITY STUDY: CPT

## 2023-09-11 PROCEDURE — 97016 VASOPNEUMATIC DEVICE THERAPY: CPT | Performed by: PHYSICAL THERAPIST

## 2023-09-11 PROCEDURE — 97110 THERAPEUTIC EXERCISES: CPT | Performed by: PHYSICAL THERAPIST

## 2023-09-11 NOTE — FLOWSHEET NOTE
[facility] and [address] [place] [address] [address],  [facility], [address] [place] [address]  200 [facility], [address] [place] [address] Avenue  Phone: [phone]  Fax: [phone]      Physical Therapy Daily Treatment Note  Date:  2023    Patient Name:  Dread Demarco    :  1955  MRN: [id number]  Restrictions/Precautions:    Medical/Treatment Diagnosis Information:  Arthrofibrosis of total knee arthroplasty, subsequent encounter [T84.82XD]  Treatment Diagnosis: M25.561 pain in right knee  Right knee SAIMA/medial-lateral retinacular release/CHASIDY   Insurance/Certification information:  PT Insurance Information: Medicare  Physician Information:  Jonathan Greene   Has the plan of care been signed (Y/N):        []  Yes  [x]  No     Date of Patient follow up with Physician:     Patient seen in consultation with Dr. King Stevens who established the initial/subsequent treatment protocol. 23: seen by Ulysses Logmerry: aspiration performed today. Pt to hold on Eliquis for 2 days. Use double compression for edema control. Limit ROM to approx. 90 degrees for 2 weeks. Full ROM by 4 weeks. 23: seen by Dr Depeti Montemayor to resume Eliquis. Continue using double compression for edema control.   23: seen in MD office: aspiration    Is this a Progress Report:     []  Yes  [x]  No        If Yes:  Date Range for reporting period:  Beginning  Ending    Progress report will be due (10 Rx or 30 days whichever is less):        Recertification will be due (POC Duration  / 90 days whichever is less): 10/3/23         Visit # Insurance Allowable Auth Required   7 medicare []  Yes [x]  No          OUTCOME MEASURE DATE SCORE   LEFS 23 62% deficit   LEFS 23 86% deficit       Latex Allergy:  [x]NO      []YES  Preferred Language for Healthcare:   [x]English       []other:    Pain level:  3/10     SUBJECTIVE: Pt reports that he had a Doppler US this AM.  He notes that he tested (+) for

## 2023-09-11 NOTE — PROGRESS NOTES
Patient seen in physical therapy today status post duplex ultrasound of his right lower extremity. He is approximate 2-week status post symptomatic his realignment with open anterior synovectomy for arthrofibrosis. He was found to have a right lower extremity DVT. He was previously on Eliquis 5 mg for history of pulmonary embolus and of upper extremity DVT however it was discontinued due to hemarthrosis postoperative. We will plan to reestablish and reinstitute the medication at this time. 2 pills prescribed and sent to the pharmacy today to give him enough to get home to [facility].     Alex Ramsey MD  Orthopedic Surgery Sports Medicine Fellow

## 2023-09-11 NOTE — TELEPHONE ENCOUNTER
Received call from vascular lab that patients right doppler was positive for acute calf DVT. Did discuss with  the next steps for patient, He is to continue back on Eliquis 5mg bid and physical therapist is to check for edema at his 1:15pm appt today.

## 2023-09-12 LAB
ACID FAST STN SPEC QL: NORMAL
ACID FAST STN SPEC QL: NORMAL
MYCOBACTERIUM SPEC CULT: NORMAL
MYCOBACTERIUM SPEC CULT: NORMAL

## 2023-09-13 ENCOUNTER — HOSPITAL ENCOUNTER (OUTPATIENT)
Dept: PHYSICAL THERAPY | Age: 68
Setting detail: THERAPIES SERIES
Discharge: HOME | End: 2023-09-13
Payer: MEDICARE

## 2023-09-13 PROCEDURE — 97016 VASOPNEUMATIC DEVICE THERAPY: CPT | Performed by: PHYSICAL THERAPY ASSISTANT

## 2023-09-13 PROCEDURE — 97110 THERAPEUTIC EXERCISES: CPT | Performed by: PHYSICAL THERAPY ASSISTANT

## 2023-09-13 PROCEDURE — 97140 MANUAL THERAPY 1/> REGIONS: CPT | Performed by: PHYSICAL THERAPY ASSISTANT

## 2023-09-14 LAB
F5 P.R506Q BLD/T QL: NEGATIVE
SPECIMEN SOURCE: NORMAL

## 2023-09-15 ENCOUNTER — HOSPITAL ENCOUNTER (OUTPATIENT)
Dept: PHYSICAL THERAPY | Age: 68
Setting detail: THERAPIES SERIES
Discharge: HOME | End: 2023-09-15
Payer: MEDICARE

## 2023-09-15 PROCEDURE — 97140 MANUAL THERAPY 1/> REGIONS: CPT | Performed by: PHYSICAL THERAPIST

## 2023-09-15 PROCEDURE — 97016 VASOPNEUMATIC DEVICE THERAPY: CPT | Performed by: PHYSICAL THERAPIST

## 2023-09-15 PROCEDURE — 97110 THERAPEUTIC EXERCISES: CPT | Performed by: PHYSICAL THERAPIST

## 2023-09-15 NOTE — FLOWSHEET NOTE
[facility] and [address] [place] [address] [address],  [facility], [address] [place] [address]  200 [facility], [address] [place] [address] Avenue  Phone: [phone]  Fax: [phone]      Physical Therapy Daily Treatment Note  Date:  9/15/2023    Patient Name:  Dread Demarco    :  1955  MRN: [id number]  Restrictions/Precautions:    Medical/Treatment Diagnosis Information:  Arthrofibrosis of total knee arthroplasty, subsequent encounter [T84.82XD]  Treatment Diagnosis: M25.561 pain in right knee  Right knee SAIMA/medial-lateral retinacular release/CHASIDY   Insurance/Certification information:  PT Insurance Information: Medicare  Physician Information:  Jonathan Greene   Has the plan of care been signed (Y/N):        []  Yes  [x]  No     Date of Patient follow up with Physician:     Patient seen in consultation with Dr. King Stevens who established the initial/subsequent treatment protocol. 23: seen by Ulysses Logmerry: aspiration performed today. Pt to hold on Eliquis for 2 days. Use double compression for edema control. Limit ROM to approx. 90 degrees for 2 weeks. Full ROM by 4 weeks. 23: seen by Dr Deepti Montemayor to resume Eliquis. Continue using double compression for edema control. 23: seen in MD office: aspiration    Is this a Progress Report:     []  Yes  [x]  No        If Yes:  Date Range for reporting period:  Beginning  Ending    Progress report will be due (10 Rx or 30 days whichever is less):        Recertification will be due (POC Duration  / 90 days whichever is less): 10/3/23         Visit # Insurance Allowable Auth Required   9 medicare []  Yes [x]  No          OUTCOME MEASURE DATE SCORE   LEFS 23 62% deficit   LEFS 23 86% deficit       Latex Allergy:  [x]NO      []YES  Preferred Language for Healthcare:   [x]English       []other:    Pain level:  3/10     SUBJECTIVE:  Has doing okay today. Feeling about the same.  Wakes up in the morning and

## 2023-09-18 ENCOUNTER — HOSPITAL ENCOUNTER (OUTPATIENT)
Dept: PHYSICAL THERAPY | Age: 68
Setting detail: THERAPIES SERIES
Discharge: HOME | End: 2023-09-18
Payer: MEDICARE

## 2023-09-18 PROCEDURE — 97140 MANUAL THERAPY 1/> REGIONS: CPT | Performed by: PHYSICAL THERAPY ASSISTANT

## 2023-09-18 PROCEDURE — 97016 VASOPNEUMATIC DEVICE THERAPY: CPT | Performed by: PHYSICAL THERAPY ASSISTANT

## 2023-09-18 PROCEDURE — 97110 THERAPEUTIC EXERCISES: CPT | Performed by: PHYSICAL THERAPY ASSISTANT

## 2023-09-18 NOTE — FLOWSHEET NOTE
Oscillations-Mobs:  G-I, II, III, IV (PA's, Inf., Post.)  [x] (32396) Provided manual therapy to mobilize LE, proximal hip and/or LS spine soft tissue/joints for the purpose of modulating pain, promoting relaxation,  increasing ROM, reducing/eliminating soft tissue swelling/inflammation/restriction, improving soft tissue extensibility and allowing for proper ROM for normal function with self care, mobility, lifting and ambulation. Modalities:    gameready 15'    Charges:  Timed Code Treatment Minutes: 48'   Total Treatment Minutes: 1:35-3:05 90'       [] EVAL (LOW) 42051 (typically 20 minutes face-to-face)  [] EVAL (MOD) 75404 (typically 30 minutes face-to-face)  [] EVAL (HIGH) 50541 (typically 45 minutes face-to-face)  [] RE-EVAL   [x] KO(90927) x 2     [] IONTO  [] NMR (55804) x      [x] VASO  [x] Manual (53694) x 1     [] Other:  [] TA x       [] Mech Traction (60387)  [] ES(attended) (54545)      [] ES (un) (42985): Hi-volt 20'    GOALS:    Patient stated goal: return to walking for exercise and hunting without pain/dysfunction. [] Progressing: [] Met: [] Not Met: [] Adjusted     Therapist goals for Patient:   Short Term Goals: To be achieved in: 2 weeks  1. Independent in HEP and progression per patient tolerance, in order to prevent re-injury. [] Progressing: [] Met: [] Not Met: [] Adjusted   2. Patient will have a decrease in pain to facilitate improvement in movement, function, and ADLs as indicated by Functional Deficits. [] Progressing: [] Met: [] Not Met: [] Adjusted     Long Term Goals: To be achieved in:  8 weeks  1. Disability index score of <25% deficit on LEFS to assist with reaching prior level of function. [] Progressing: [] Met: [] Not Met: [] Adjusted  2. Patient will demonstrate increased AROM to 0-135 to allow for proper joint functioning as indicated by patients Functional Deficits. [] Progressing: [] Met: [] Not Met: [] Adjusted  3.  Patient will demonstrate an increase in

## 2023-09-19 ENCOUNTER — APPOINTMENT (OUTPATIENT)
Dept: PHYSICAL THERAPY | Age: 68
End: 2023-09-19
Payer: MEDICARE

## 2023-09-20 ENCOUNTER — APPOINTMENT (OUTPATIENT)
Dept: PHYSICAL THERAPY | Age: 68
End: 2023-09-20
Payer: MEDICARE

## 2023-09-20 ENCOUNTER — HOSPITAL ENCOUNTER (OUTPATIENT)
Dept: PHYSICAL THERAPY | Age: 68
Setting detail: THERAPIES SERIES
Discharge: HOME | End: 2023-09-20
Payer: MEDICARE

## 2023-09-20 NOTE — FLOWSHEET NOTE
[facility] and [address] [place] [address] [address],  [facility], [address] [place] [address]  200 [facility], [address] [place] [address] Avenue  Phone: [phone]  Fax: [phone]      Physical Therapy Daily Treatment Note  Date:  2023    Patient Name:  Yannick Otero    :  1955  MRN: [id number]  Restrictions/Precautions:    Medical/Treatment Diagnosis Information:  Arthrofibrosis of total knee arthroplasty, subsequent encounter [T84.82XD]  Treatment Diagnosis: M25.561 pain in right knee  Right knee SAIMA/medial-lateral retinacular release/CHASIDY   Insurance/Certification information:  PT Insurance Information: Medicare  Physician Information:  Gracy Wilson   Has the plan of care been signed (Y/N):        []  Yes  [x]  No     Date of Patient follow up with Physician:     Patient seen in consultation with Dr. Korin Hurtado who established the initial/subsequent treatment protocol. 23: seen by Chema Lanier: aspiration performed today. Pt to hold on Eliquis for 2 days. Use double compression for edema control. Limit ROM to approx. 90 degrees for 2 weeks. Full ROM by 4 weeks. 23: seen by Dr Zack Mackey to resume Eliquis. Continue using double compression for edema control.   23: seen in MD office: aspiration    Is this a Progress Report:     []  Yes  [x]  No        If Yes:  Date Range for reporting period:  Beginning  Ending    Progress report will be due (10 Rx or 30 days whichever is less): 3/19/85       Recertification will be due (POC Duration  / 90 days whichever is less): 10/3/23         Visit # Insurance Allowable Auth Required   11 medicare []  Yes [x]  No          OUTCOME MEASURE DATE SCORE   LEFS 23 62% deficit   LEFS 23 86% deficit       Latex Allergy:  [x]NO      []YES  Preferred Language for Healthcare:   [x]English       []other:    Pain level:  3/10     SUBJECTIVE:  Reports medial knee pain after sitting 30min, does resolve after

## 2023-09-21 ENCOUNTER — HOSPITAL ENCOUNTER (OUTPATIENT)
Dept: PHYSICAL THERAPY | Age: 68
Setting detail: THERAPIES SERIES
Discharge: HOME | End: 2023-09-21
Payer: MEDICARE

## 2023-09-21 PROCEDURE — 97016 VASOPNEUMATIC DEVICE THERAPY: CPT | Performed by: PHYSICAL THERAPY ASSISTANT

## 2023-09-21 PROCEDURE — 97140 MANUAL THERAPY 1/> REGIONS: CPT | Performed by: PHYSICAL THERAPY ASSISTANT

## 2023-09-21 PROCEDURE — 97110 THERAPEUTIC EXERCISES: CPT | Performed by: PHYSICAL THERAPY ASSISTANT

## 2023-09-21 PROCEDURE — 97112 NEUROMUSCULAR REEDUCATION: CPT | Performed by: PHYSICAL THERAPY ASSISTANT

## 2023-09-21 NOTE — FLOWSHEET NOTE
[facility] and [address] [place] [address] [address],  [facility], [address] [place] [address]  200 [facility], [address] [place] [address] Avenue  Phone: [phone]  Fax: [phone]      Physical Therapy Daily Treatment Note  Date:  2023    Patient Name:  Yannick Otero    :  1955  MRN: [id number]  Restrictions/Precautions:    Medical/Treatment Diagnosis Information:  Arthrofibrosis of total knee arthroplasty, subsequent encounter [T84.82XD]  Treatment Diagnosis: M25.561 pain in right knee  Right knee SAIMA/medial-lateral retinacular release/CHASIDY   Insurance/Certification information:  PT Insurance Information: Medicare  Physician Information:  Gracy Wilson   Has the plan of care been signed (Y/N):        []  Yes  [x]  No     Date of Patient follow up with Physician:     Patient seen in consultation with Dr. Korin Hurtado who established the initial/subsequent treatment protocol. 23: seen by Chema Lanier: aspiration performed today. Pt to hold on Eliquis for 2 days. Use double compression for edema control. Limit ROM to approx. 90 degrees for 2 weeks. Full ROM by 4 weeks. 23: seen by Dr Zack Mackey to resume Eliquis. Continue using double compression for edema control.   23: seen in MD office: aspiration    Is this a Progress Report:     []  Yes  [x]  No        If Yes:  Date Range for reporting period:  Beginning  Ending    Progress report will be due (10 Rx or 30 days whichever is less):        Recertification will be due (POC Duration  / 90 days whichever is less): 10/3/23         Visit # Insurance Allowable Auth Required   12 medicare []  Yes [x]  No          OUTCOME MEASURE DATE SCORE   LEFS 23 62% deficit   LEFS 23 86% deficit       Latex Allergy:  [x]NO      []YES  Preferred Language for Healthcare:   [x]English       []other:    Pain level:  3/10     SUBJECTIVE:  Reports medial knee pain after sitting 30min, does resolve after

## 2023-09-25 ENCOUNTER — HOSPITAL ENCOUNTER (OUTPATIENT)
Dept: PHYSICAL THERAPY | Age: 68
Setting detail: THERAPIES SERIES
Discharge: HOME | End: 2023-09-25
Payer: MEDICARE

## 2023-09-25 ENCOUNTER — APPOINTMENT (OUTPATIENT)
Dept: PHYSICAL THERAPY | Age: 68
End: 2023-09-25
Payer: MEDICARE

## 2023-09-25 PROCEDURE — 97110 THERAPEUTIC EXERCISES: CPT | Performed by: PHYSICAL THERAPY ASSISTANT

## 2023-09-25 PROCEDURE — 97112 NEUROMUSCULAR REEDUCATION: CPT | Performed by: PHYSICAL THERAPY ASSISTANT

## 2023-09-25 PROCEDURE — 97016 VASOPNEUMATIC DEVICE THERAPY: CPT | Performed by: PHYSICAL THERAPY ASSISTANT

## 2023-09-25 PROCEDURE — 97140 MANUAL THERAPY 1/> REGIONS: CPT | Performed by: PHYSICAL THERAPY ASSISTANT

## 2023-09-27 ENCOUNTER — HOSPITAL ENCOUNTER (OUTPATIENT)
Dept: PHYSICAL THERAPY | Age: 68
Setting detail: THERAPIES SERIES
Discharge: HOME | End: 2023-09-27
Payer: MEDICARE

## 2023-09-27 ENCOUNTER — APPOINTMENT (OUTPATIENT)
Dept: PHYSICAL THERAPY | Age: 68
End: 2023-09-27
Payer: MEDICARE

## 2023-09-27 PROCEDURE — 97140 MANUAL THERAPY 1/> REGIONS: CPT | Performed by: PHYSICAL THERAPY ASSISTANT

## 2023-09-27 PROCEDURE — 97016 VASOPNEUMATIC DEVICE THERAPY: CPT | Performed by: PHYSICAL THERAPY ASSISTANT

## 2023-09-27 PROCEDURE — 97112 NEUROMUSCULAR REEDUCATION: CPT | Performed by: PHYSICAL THERAPY ASSISTANT

## 2023-09-27 PROCEDURE — 97110 THERAPEUTIC EXERCISES: CPT | Performed by: PHYSICAL THERAPY ASSISTANT

## 2023-09-27 NOTE — FLOWSHEET NOTE
[] Progressing: [] Met: [] Not Met: [] Adjusted     Long Term Goals: To be achieved in:  8 weeks  1. Disability index score of <25% deficit on LEFS to assist with reaching prior level of function. [] Progressing: [] Met: [] Not Met: [] Adjusted  2. Patient will demonstrate increased AROM to 0-135 to allow for proper joint functioning as indicated by patients Functional Deficits. [] Progressing: [] Met: [] Not Met: [] Adjusted  3. Patient will demonstrate an increase in Strength to good proximal hip strength and control, within 5lb HHD in LE to allow for proper functional mobility as indicated by patients Functional Deficits. [] Progressing: [] Met: [] Not Met: [] Adjusted  4. Patient will return to all functional activities without increased symptoms or restriction. [] Progressing: [] Met: [] Not Met: [] Adjusted  5. Patient will be able to ambulate > 30 minutes with normal gait without AD/pain or dysfunction. [] Progressing: [] Met: [] Not Met: [] Adjusted  6. Patient will be able to manage a flight of stairs with normal gait without AD/pain or dysfunction. [] Progressing: [] Met: [] Not Met: [] Adjusted                     Overall Progression Towards Functional goals/ Treatment Progress Update:  [] Patient is progressing as expected towards functional goals listed. [] Progression is slowed due to complexities/Impairments listed. [] Progression has been slowed due to co-morbidities.   [x] Plan just implemented, too soon to assess goals progression <30days   [] Goals require adjustment due to lack of progress  [] Patient is not progressing as expected and requires additional follow up with physician  [] Other    Prognosis for POC: [x] Good [] Fair  [] Poor      Patient requires continued skilled intervention: [x] Yes  [] No    Treatment/Activity Tolerance:  [x] Patient able to complete treatment  [] Patient limited by fatigue  [] Patient limited by pain     [] Patient limited by other medical
Improved

## 2023-09-28 ENCOUNTER — APPOINTMENT (OUTPATIENT)
Dept: PHYSICAL THERAPY | Age: 68
End: 2023-09-28
Payer: MEDICARE

## 2023-10-02 ENCOUNTER — HOSPITAL ENCOUNTER (OUTPATIENT)
Dept: PHYSICAL THERAPY | Age: 68
Setting detail: THERAPIES SERIES
Discharge: HOME | End: 2023-10-02
Payer: MEDICARE

## 2023-10-02 ENCOUNTER — APPOINTMENT (OUTPATIENT)
Dept: PHYSICAL THERAPY | Age: 68
End: 2023-10-02
Payer: MEDICARE

## 2023-10-02 PROCEDURE — 97140 MANUAL THERAPY 1/> REGIONS: CPT | Performed by: PHYSICAL THERAPY ASSISTANT

## 2023-10-02 PROCEDURE — 97112 NEUROMUSCULAR REEDUCATION: CPT | Performed by: PHYSICAL THERAPY ASSISTANT

## 2023-10-02 PROCEDURE — 97016 VASOPNEUMATIC DEVICE THERAPY: CPT | Performed by: PHYSICAL THERAPY ASSISTANT

## 2023-10-02 PROCEDURE — 97110 THERAPEUTIC EXERCISES: CPT | Performed by: PHYSICAL THERAPY ASSISTANT

## 2023-10-02 NOTE — FLOWSHEET NOTE
[facility] and [address] [place] [address] [address],  [facility], [address] [place] [address]  200 [facility], [address] [place] [address] Avenue  Phone: [phone]  Fax: [phone]      Physical Therapy Daily Treatment Note  Date:  10/2/2023    Patient Name:  Boston Dean    :  1955  MRN: [id number]  Restrictions/Precautions:    Medical/Treatment Diagnosis Information:  Arthrofibrosis of total knee arthroplasty, subsequent encounter [T84.82XD]  Treatment Diagnosis: M25.561 pain in right knee  Right knee SAIMA/medial-lateral retinacular release/CHASIDY   Insurance/Certification information:  PT Insurance Information: Medicare  Physician Information:  Adri Ren   Has the plan of care been signed (Y/N):        []  Yes  [x]  No     Date of Patient follow up with Physician: 10-10-23    Patient seen in consultation with Dr. Junior Denis who established the initial/subsequent treatment protocol. 23: seen by Joy Teran: aspiration performed today. Pt to hold on Eliquis for 2 days. Use double compression for edema control. Limit ROM to approx. 90 degrees for 2 weeks. Full ROM by 4 weeks. 23: seen by Dr Jose Manuel West to resume Eliquis. Continue using double compression for edema control.   23: seen in MD office: aspiration    Is this a Progress Report:     []  Yes  [x]  No        If Yes:  Date Range for reporting period:  Beginning  Ending    Progress report will be due (10 Rx or 30 days whichever is less): 26       Recertification will be due (POC Duration  / 90 days whichever is less): 10/3/23         Visit # Insurance Allowable Auth Required   16 medicare []  Yes [x]  No          OUTCOME MEASURE DATE SCORE   LEFS 23 62% deficit   LEFS 23 86% deficit   LEFS 23 64% deficit       Latex Allergy:  [x]NO      []YES  Preferred Language for Healthcare:   [x]English       []other:    Pain level:  3/10 infrequent medial knee pain     SUBJECTIVE:  4 weeks post
none

## 2023-10-03 ENCOUNTER — HOSPITAL ENCOUNTER (OUTPATIENT)
Dept: PHYSICAL THERAPY | Age: 68
Setting detail: THERAPIES SERIES
Discharge: HOME | End: 2023-10-03
Payer: MEDICARE

## 2023-10-03 PROCEDURE — 97016 VASOPNEUMATIC DEVICE THERAPY: CPT | Performed by: PHYSICAL THERAPY ASSISTANT

## 2023-10-03 PROCEDURE — 97140 MANUAL THERAPY 1/> REGIONS: CPT | Performed by: PHYSICAL THERAPY ASSISTANT

## 2023-10-03 PROCEDURE — 97112 NEUROMUSCULAR REEDUCATION: CPT | Performed by: PHYSICAL THERAPY ASSISTANT

## 2023-10-03 PROCEDURE — 97110 THERAPEUTIC EXERCISES: CPT | Performed by: PHYSICAL THERAPY ASSISTANT

## 2023-10-03 NOTE — FLOWSHEET NOTE
op. Knee feels stiff today. Pt has found that quad activiation with knee extended feels better to knee vs HS activation exercises where knee is going into flexion. Had good and bad days over weekend- knee feels better after a \"rest\" day. Swelling has stayed manageable but does fluctuate. Long car ride is uncomfortable if sitting with knee in too much flexion, minimal to no pain if knee is stretched out. Stiffness persists but does resolve quickly with walking. Anxious to return to walking. Pain noted every morning when standing 5-10 minutes to cook breakfast. Overall pain is significantly improved from pre op status especially with sitting. OBJECTIVE:   Observation:   Test measurements:        Flexibility L R Comment   Hamstring         Gastroc         ITB         Quad                      10/3/23          ROM PROM AROM Overpressure Comment     L R L R L R     Flexion           129 ERMI  132 best    Extension       0                                                   Strength L R Comment   Quad   4+/5     Hamstring   4+/5     Gastroc         Hip  flexion   4+/5     Hip abd   4+/5               Quad good good [email or web address] 8/29/23         Special Test Results/Comment   Meniscal Click     Crepitus (+) 90-0 pain throughout movement   Flexion Test     Valgus Laxity     Varus Laxity     Lachmans     Drop Back     Homans               9/13/23  Girth (cm) L R Post-Vaso   Mid Patella 41.0 44  42.5   Suprapatellar 42.8 44.2  44.0   5cm above 47.5 44.2 N/A   15cm above     N/A   Vasopneumatic compression applied to knee for significant edema, swelling, pain control. ICD-10 code: R60.9 Edema unspecified.       Reflexes/Sensation:               []Dermatomes/Myotomes intact               []Reflexes equal and normal bilaterally              []Other:     Joint mobility: PFJ 8/29/23              [x]Normal                      []Hypo               []Hyper     Palpation: NT     Functional Mobility/Transfers: modified independent

## 2023-10-05 ENCOUNTER — APPOINTMENT (OUTPATIENT)
Dept: PHYSICAL THERAPY | Age: 68
End: 2023-10-05
Payer: MEDICARE

## 2023-10-06 ENCOUNTER — HOSPITAL ENCOUNTER (OUTPATIENT)
Dept: PHYSICAL THERAPY | Age: 68
Setting detail: THERAPIES SERIES
Discharge: HOME | End: 2023-10-06
Payer: MEDICARE

## 2023-10-06 PROCEDURE — 97140 MANUAL THERAPY 1/> REGIONS: CPT | Performed by: PHYSICAL THERAPIST

## 2023-10-06 PROCEDURE — 97530 THERAPEUTIC ACTIVITIES: CPT | Performed by: PHYSICAL THERAPIST

## 2023-10-06 PROCEDURE — 97110 THERAPEUTIC EXERCISES: CPT | Performed by: PHYSICAL THERAPIST

## 2023-10-06 PROCEDURE — 97016 VASOPNEUMATIC DEVICE THERAPY: CPT | Performed by: PHYSICAL THERAPIST

## 2023-10-06 PROCEDURE — 97112 NEUROMUSCULAR REEDUCATION: CPT | Performed by: PHYSICAL THERAPIST

## 2023-10-06 NOTE — PLAN OF CARE
progression <30days   [] Goals require adjustment due to lack of progress  [] Patient is not progressing as expected and requires additional follow up with physician  [x] Other: arthrofibrosis    Prognosis for POC: [x] Good [] Fair  [] Poor      Patient requires continued skilled intervention: [x] Yes  [] No    Treatment/Activity Tolerance:  [x] Patient able to complete treatment  [] Patient limited by fatigue  [] Patient limited by pain     [] Patient limited by other medical complications  [x] Other:  See above        PLAN: See above  [] Continue per plan of care [x] Alter current plan (see comments above)  [] Plan of care initiated [] Hold pending MD visit [] Discharge  3x/week for 4-6 weeks. Monitor ROM changes  Balance     Electronically signed by:  Daysi oS PT, DPT    Note: If patient does not return for scheduled/ recommended follow up visits, this note will serve as a discharge from care along with most recent update on progress.

## 2023-10-09 ENCOUNTER — HOSPITAL ENCOUNTER (OUTPATIENT)
Dept: PHYSICAL THERAPY | Age: 68
Setting detail: THERAPIES SERIES
Discharge: HOME | End: 2023-10-09
Payer: MEDICARE

## 2023-10-09 ENCOUNTER — APPOINTMENT (OUTPATIENT)
Dept: PHYSICAL THERAPY | Age: 68
End: 2023-10-09
Payer: MEDICARE

## 2023-10-09 NOTE — FLOWSHEET NOTE
[facility] and [address] [place] [address] [address],  [facility], [address] [place] [address], [address] [place] [address] Avenue  Phone: [phone]  Fax: [phone]      Physical Therapy  Cancellation/No-show Note  Patient Name:  Jean Palu Temple  :  1955   Date:  10/9/2023  Cancelled visits to date: 0  No-shows to date: 0    For today's appointment patient:  [x]  Cancelled  []  Rescheduled appointment  []  No-show     Reason given by patient:  [x]  Patient ill  []  Conflicting appointment   []  No transportation    []  Conflict with work  []  No reason given  []  Other:     Comments:      Electronically signed by:  Keith Gaming PT

## 2023-10-10 ENCOUNTER — APPOINTMENT (OUTPATIENT)
Dept: PHYSICAL THERAPY | Age: 68
End: 2023-10-10
Payer: MEDICARE

## 2023-10-10 ENCOUNTER — HOSPITAL ENCOUNTER (OUTPATIENT)
Dept: PHYSICAL THERAPY | Age: 68
Setting detail: THERAPIES SERIES
Discharge: HOME | End: 2023-10-10
Payer: MEDICARE

## 2023-10-10 NOTE — FLOWSHEET NOTE
[facility] and [address] [place] [address] [address],  [facility], [address] [place] [address], [address] [place] [address] Avenue  Phone: [phone]  Fax: [phone]      Physical Therapy  Cancellation/No-show Note  Patient Name:  Sylvia Rizzo  :  1955   Date:  10/10/2023  Cancelled visits to date: 3  No-shows to date: 0    For today's appointment patient:  [x]  Cancelled  []  Rescheduled appointment  []  No-show     Reason given by patient:  [x]  Patient ill  []  Conflicting appointment   []  No transportation    []  Conflict with work  []  No reason given  []  Other:     Comments:      Electronically signed by:  Davon Artis PTA

## 2023-10-12 ENCOUNTER — APPOINTMENT (OUTPATIENT)
Dept: PHYSICAL THERAPY | Age: 68
End: 2023-10-12
Payer: MEDICARE

## 2023-10-16 ENCOUNTER — APPOINTMENT (OUTPATIENT)
Dept: PHYSICAL THERAPY | Age: 68
End: 2023-10-16
Payer: MEDICARE

## 2023-10-18 ENCOUNTER — APPOINTMENT (OUTPATIENT)
Dept: PHYSICAL THERAPY | Age: 68
End: 2023-10-18
Payer: MEDICARE

## 2023-10-20 ENCOUNTER — APPOINTMENT (OUTPATIENT)
Dept: PHYSICAL THERAPY | Age: 68
End: 2023-10-20
Payer: MEDICARE

## 2023-10-23 ENCOUNTER — APPOINTMENT (OUTPATIENT)
Dept: PHYSICAL THERAPY | Age: 68
End: 2023-10-23
Payer: MEDICARE

## 2023-10-25 ENCOUNTER — HOSPITAL ENCOUNTER (OUTPATIENT)
Dept: PHYSICAL THERAPY | Age: 68
Setting detail: THERAPIES SERIES
End: 2023-10-25
Payer: MEDICARE

## 2023-10-25 ENCOUNTER — APPOINTMENT (OUTPATIENT)
Dept: PHYSICAL THERAPY | Age: 68
End: 2023-10-25
Payer: MEDICARE

## 2023-10-26 ENCOUNTER — APPOINTMENT (OUTPATIENT)
Dept: PHYSICAL THERAPY | Age: 68
End: 2023-10-26
Payer: MEDICARE

## 2023-10-27 ENCOUNTER — APPOINTMENT (OUTPATIENT)
Dept: PHYSICAL THERAPY | Age: 68
End: 2023-10-27
Payer: MEDICARE

## 2023-10-30 ENCOUNTER — APPOINTMENT (OUTPATIENT)
Dept: PHYSICAL THERAPY | Age: 68
End: 2023-10-30
Payer: MEDICARE

## 2023-11-01 ENCOUNTER — HOSPITAL ENCOUNTER (OUTPATIENT)
Dept: PHYSICAL THERAPY | Age: 68
Setting detail: THERAPIES SERIES
Discharge: HOME | End: 2023-11-01
Payer: MEDICARE

## 2023-11-01 PROCEDURE — 97112 NEUROMUSCULAR REEDUCATION: CPT | Performed by: PHYSICAL THERAPY ASSISTANT

## 2023-11-01 PROCEDURE — 97110 THERAPEUTIC EXERCISES: CPT | Performed by: PHYSICAL THERAPY ASSISTANT

## 2023-11-01 PROCEDURE — 97016 VASOPNEUMATIC DEVICE THERAPY: CPT | Performed by: PHYSICAL THERAPY ASSISTANT

## 2023-11-01 PROCEDURE — 97140 MANUAL THERAPY 1/> REGIONS: CPT | Performed by: PHYSICAL THERAPY ASSISTANT

## 2023-11-01 NOTE — FLOWSHEET NOTE
treatment  [] Patient limited by fatigue  [] Patient limited by pain     [] Patient limited by other medical complications  [x] Other: Tolerated strength exercises fairly well. (+) pain with change in position from full extension to flexion and full flexion to extension. Pain along inferior medial border of patella with certain quad activation exercises. Decreased patella mobility and KF ROM from previous visit. Advised pt to f/u with MD next week. PLAN: See above  [] Continue per plan of care [x] Alter current plan (see comments above)  [] Plan of care initiated [] Hold pending MD visit [] Discharge  3x/week for 4-6 weeks. Monitor ROM changes  Balance   MD check 11-7-23    Electronically signed by:  Sivakumar Dalton PTA    Note: If patient does not return for scheduled/ recommended follow up visits, this note will serve as a discharge from care along with most recent update on progress.

## 2023-11-02 ENCOUNTER — APPOINTMENT (OUTPATIENT)
Dept: PHYSICAL THERAPY | Age: 68
End: 2023-11-02
Payer: MEDICARE

## 2023-11-07 ENCOUNTER — OFFICE VISIT (OUTPATIENT)
Dept: ORTHOPEDIC SURGERY | Age: 68
End: 2023-11-07
Payer: MEDICARE

## 2023-11-07 ENCOUNTER — HOSPITAL ENCOUNTER (OUTPATIENT)
Dept: PHYSICAL THERAPY | Age: 68
Setting detail: THERAPIES SERIES
Discharge: HOME | End: 2023-11-07
Payer: MEDICARE

## 2023-11-07 VITALS — BODY MASS INDEX: 30.66 KG/M2 | HEIGHT: 69 IN | WEIGHT: 207 LBS

## 2023-11-07 DIAGNOSIS — T84.82XD ARTHROFIBROSIS OF TOTAL KNEE ARTHROPLASTY, SUBSEQUENT ENCOUNTER: Primary | ICD-10-CM

## 2023-11-07 PROCEDURE — 97110 THERAPEUTIC EXERCISES: CPT | Performed by: PHYSICAL THERAPIST

## 2023-11-07 PROCEDURE — G8428 CUR MEDS NOT DOCUMENT: HCPCS | Performed by: ORTHOPAEDIC SURGERY

## 2023-11-07 PROCEDURE — 99213 OFFICE O/P EST LOW 20 MIN: CPT | Performed by: ORTHOPAEDIC SURGERY

## 2023-11-07 PROCEDURE — 97016 VASOPNEUMATIC DEVICE THERAPY: CPT | Performed by: PHYSICAL THERAPIST

## 2023-11-07 PROCEDURE — 1124F ACP DISCUSS-NO DSCNMKR DOCD: CPT | Performed by: ORTHOPAEDIC SURGERY

## 2023-11-07 PROCEDURE — 3017F COLORECTAL CA SCREEN DOC REV: CPT | Performed by: ORTHOPAEDIC SURGERY

## 2023-11-07 PROCEDURE — 97140 MANUAL THERAPY 1/> REGIONS: CPT | Performed by: PHYSICAL THERAPIST

## 2023-11-07 PROCEDURE — G8417 CALC BMI ABV UP PARAM F/U: HCPCS | Performed by: ORTHOPAEDIC SURGERY

## 2023-11-07 PROCEDURE — G8484 FLU IMMUNIZE NO ADMIN: HCPCS | Performed by: ORTHOPAEDIC SURGERY

## 2023-11-07 PROCEDURE — 1036F TOBACCO NON-USER: CPT | Performed by: ORTHOPAEDIC SURGERY

## 2023-11-07 PROCEDURE — 97112 NEUROMUSCULAR REEDUCATION: CPT | Performed by: PHYSICAL THERAPIST

## 2023-11-07 RX ORDER — METHYLPREDNISOLONE 4 MG/1
TABLET ORAL
Qty: 1 KIT | Refills: 1 | Status: SHIPPED | OUTPATIENT
Start: 2023-11-07 | End: 2023-11-13

## 2023-11-07 NOTE — PROGRESS NOTES
Chief Complaint    Knee Pain (CK right knee )      History of Present Illness:  Rogers Gary is a 79 y.o. male who presents for follow up of right knee(s). Patient has extensive history of treatment on his right knee which includes a medial unicompartmental knee arthoplasty 12/8/2022 in [place] and anterior synovectomy and extensor mechanism realignment on 8/28/2023 by Dr. Constantin Roman. Patient experienced hematoma of his knee following surgery that required an aspiration on 8/29/2023 and 9/5/2023. Patient presents today still experiencing pain and tightness along the medical side of his knee. Patient reports he cannot sit with his knee flexed to 90 degrees for more than 15 minutes due to pain and stiffness. Sitting in extension is not as bad for him. Patient has been working in physical therapy and using the [employer]. He was able to get 0-131 on 10/6/2023 but experienced severe soreness following that appointment. He has resumed Eliquis since developing a DVT in September. He was negative for the Factor 5 genetic test. Patient expresses frustration with not being able to go on walks with his wife or go bird hunting due to pain. The surgical procedure has been successful in relieving the sharp anterior knee pain that the patient was experiencing.   Main problem is trying to get further knee flexion past 120 degrees this does produce knee joint pain for several days thereafter       Past Medical History:   Diagnosis Date    A-fib ([address])     Arthritis     CAD (coronary artery disease)     PT SAYS HAS BLOCKAGES, NEVER STENTED    Chronic prostatitis     Diabetes mellitus ([address])     PRE    Heart disease     Hx of blood clots     4 CLOTS LUNG & LEG    Hyperlipidemia     Hypertension     Polycythemia     DOES PHLEBOTOMY EVERY MONTH & BEFORE SURGERIES, PT SCHEDULED TO HAVE 8/25/23 DR Brittany [place]    Sleep apnea     CAN;T TOLERATE CPAP    SOB (shortness of breath) on exertion     \"RUNNERS ARRYTHMIA\"        Past Surgical

## 2023-11-07 NOTE — FLOWSHEET NOTE
[facility] and [address] [place] [address] [address],  [facility], [address] [place] [address]  200 [facility], [address] [place] [address] Avenue  Phone: [phone]  Fax: [phone]    Physical Therapy Daily Treatment Note  Date:  2023    Patient Name:  Bernadette Prince    :  1955  MRN: [id number]  Restrictions/Precautions:    Medical/Treatment Diagnosis Information:  Arthrofibrosis of total knee arthroplasty, subsequent encounter [T84.82XD]  Treatment Diagnosis: M25.561 pain in right knee  Right knee SAIMA/medial-lateral retinacular release/CHASIDY   Insurance/Certification information:  PT Insurance Information: Medicare  Physician Information:  Paty Dominique   Has the plan of care been signed (Y/N):        [x]  Yes  []  No     Date of Patient follow up with Physician: 10-10-23    Patient seen in consultation with Dr. Minerva Antunez who established the initial/subsequent treatment protocol. 23: seen by Alfredo Points: aspiration performed today. Pt to hold on Eliquis for 2 days. Use double compression for edema control. Limit ROM to approx. 90 degrees for 2 weeks. Full ROM by 4 weeks. 23: seen by Dr Kosta Bailon to resume Eliquis. Continue using double compression for edema control. 23: seen in MD office: aspiration    Is this a POC Report:     [x]  Yes  []  No        If Yes:  Date Range for reporting period:  Beginnin23  Ending: 10/6/23    POC report will be due (10 Rx or 30 days whichever is less): 23      Visit # Insurance Allowable Auth Required   20 medicare []  Yes [x]  No          OUTCOME MEASURE DATE SCORE   LEFS 23 62% deficit   LEFS 23 86% deficit   LEFS 23 64% deficit       Latex Allergy:  [x]NO      []YES  Preferred Language for Healthcare:   [x]English       []other:    Pain level:  3/10 infrequent medial knee pain     SUBJECTIVE:  9 weeks post op. Reports that he has been experiencing increased anterior knee pain as of late.

## 2023-11-08 ENCOUNTER — HOSPITAL ENCOUNTER (OUTPATIENT)
Dept: PHYSICAL THERAPY | Age: 68
Setting detail: THERAPIES SERIES
Discharge: HOME | End: 2023-11-08
Payer: MEDICARE

## 2023-11-08 PROCEDURE — 97112 NEUROMUSCULAR REEDUCATION: CPT | Performed by: PHYSICAL THERAPY ASSISTANT

## 2023-11-08 PROCEDURE — 97110 THERAPEUTIC EXERCISES: CPT | Performed by: PHYSICAL THERAPY ASSISTANT

## 2023-11-08 PROCEDURE — 97016 VASOPNEUMATIC DEVICE THERAPY: CPT | Performed by: PHYSICAL THERAPY ASSISTANT

## 2023-11-08 NOTE — FLOWSHEET NOTE
with HEP. He saw Dr Lars Peter yesterday and he is being placed on a MDP. They discussed possibly seeing Dr Della Jimenez for a PRP injection in the future. OBJECTIVE:      Flexibility L R Comment   Hamstring         Gastroc         ITB         Quad                      11/8/23          ROM PROM AROM Overpressure Comment     L R L R L R     Flexion          121 ERMI  132 best    Extension       0                                                   Strength L R Comment   Quad   4+/5     Hamstring   4+/5     Gastroc         Hip  flexion   4+/5     Hip abd   4+/5               Quad good good [email or web address] 8/29/23         Special Test Results/Comment   Meniscal Click     Crepitus (+) 90-0 pain throughout movement   Flexion Test     Valgus Laxity     Varus Laxity     Lachmans     Drop Back     Homans               9/13/23  Girth (cm) L R Post-Vaso   Mid Patella 41.0 44  42.5   Suprapatellar 42.8 44.2  44.0   5cm above 47.5 44.2 N/A   15cm above     N/A   Vasopneumatic compression applied to knee for significant edema, swelling, pain control. ICD-10 code: R60.9 Edema unspecified.       Reflexes/Sensation:               []Dermatomes/Myotomes intact               []Reflexes equal and normal bilaterally              []Other:     Joint mobility: PFJ 8/29/23              [x]Normal                      []Hypo               []Hyper     Palpation: NT     Functional Mobility/Transfers: modified independent with modifications in deeper knee flexion positions 10/6/23     Posture: knee alignment normal, moderate diffused edema present peripatellar 10/6/23     Bandages/Dressings/Incisions: healing well 10/6/23     Gait:WBAT, normalized mechanics, dec gait speed, no AD 10/6/23     Orthopedic Special Tests:      TEST INITIAL     8/22/23 FOLLOW  UP GOAL   SINGLE LEG STANCE TIME L: 1 sec     R: 3.42 sec   >25 SECONDS   6 MINUTE WALK TEST 0.19 miles                       M             F          60-70 y/o: >.31mile,  >.30mile  73-77 y/o: >.28

## 2023-11-10 ENCOUNTER — APPOINTMENT (OUTPATIENT)
Dept: PHYSICAL THERAPY | Age: 68
End: 2023-11-10
Payer: MEDICARE

## 2023-11-13 ENCOUNTER — HOSPITAL ENCOUNTER (OUTPATIENT)
Dept: PHYSICAL THERAPY | Age: 68
Setting detail: THERAPIES SERIES
Discharge: HOME | End: 2023-11-13
Payer: MEDICARE

## 2023-11-13 PROCEDURE — 97112 NEUROMUSCULAR REEDUCATION: CPT | Performed by: PHYSICAL THERAPIST

## 2023-11-13 PROCEDURE — 97110 THERAPEUTIC EXERCISES: CPT | Performed by: PHYSICAL THERAPIST

## 2023-11-13 PROCEDURE — 97016 VASOPNEUMATIC DEVICE THERAPY: CPT | Performed by: PHYSICAL THERAPIST

## 2023-11-13 NOTE — FLOWSHEET NOTE
[facility] and [address] [place] [address] [address],  [facility], [address] [place] [address]  [address]  Phone: [phone]  Fax: [phone]    Physical Therapy Daily Treatment Note  Date:  2023    Patient Name:  Melvin Gamez    :  1955  MRN: [id number]  Restrictions/Precautions:    Medical/Treatment Diagnosis Information:  Arthrofibrosis of total knee arthroplasty, subsequent encounter [T84.82XD]  Treatment Diagnosis: M25.561 pain in right knee  Right knee SAIMA/medial-lateral retinacular release/CHASIDY 71  Insurance/Certification information:  PT Insurance Information: Medicare  Physician Information:  Jeanette Langley   Has the plan of care been signed (Y/N):        [x]  Yes  []  No     Date of Patient follow up with Physician: 10-10-23    Patient seen in consultation with Dr. Michael Schaefer who established the initial/subsequent treatment protocol. 23: seen by Marge Mcginnis: aspiration performed today. Pt to hold on Eliquis for 2 days. Use double compression for edema control. Limit ROM to approx. 90 degrees for 2 weeks. Full ROM by 4 weeks. 23: seen by Dr Hanh Delgado to resume Eliquis. Continue using double compression for edema control. 23: seen in MD office: aspiration    Is this a POC Report:     [x]  Yes  []  No        If Yes:  Date Range for reporting period:  Beginnin23  Ending: 10/6/23    POC report will be due (10 Rx or 30 days whichever is less): 23      Visit # Insurance Allowable Auth Required   21 medicare []  Yes [x]  No          OUTCOME MEASURE DATE SCORE   LEFS 23 62% deficit   LEFS 23 86% deficit   LEFS 23 64% deficit       Latex Allergy:  [x]NO      []YES  Preferred Language for Healthcare:   [x]English       []other:    Pain level:  3/10 infrequent medial knee pain     SUBJECTIVE:  Reports that he is several days into the MDP.   He notes that this has helped his symptoms

## 2023-11-15 ENCOUNTER — APPOINTMENT (OUTPATIENT)
Dept: PHYSICAL THERAPY | Age: 68
End: 2023-11-15
Payer: MEDICARE

## 2023-11-16 ENCOUNTER — HOSPITAL ENCOUNTER (OUTPATIENT)
Dept: PHYSICAL THERAPY | Age: 68
Setting detail: THERAPIES SERIES
Discharge: HOME | End: 2023-11-16
Payer: MEDICARE

## 2023-11-16 PROCEDURE — 97112 NEUROMUSCULAR REEDUCATION: CPT | Performed by: PHYSICAL THERAPY ASSISTANT

## 2023-11-16 PROCEDURE — 97110 THERAPEUTIC EXERCISES: CPT | Performed by: PHYSICAL THERAPY ASSISTANT

## 2023-11-16 PROCEDURE — 97016 VASOPNEUMATIC DEVICE THERAPY: CPT | Performed by: PHYSICAL THERAPY ASSISTANT

## 2023-11-16 NOTE — FLOWSHEET NOTE
after PT sessions although not as bad. He also feels like his ROM is improving. He will be meeting with Dr Felix Rodriguez in 1 month to discuss PRP injections. OBJECTIVE:      Flexibility L R Comment   Hamstring         Gastroc         ITB         Quad                      11/13/23          ROM PROM AROM Overpressure Comment     L R L R L R     Flexion          127 ERMI  132 best    Extension       0                                                   Strength L R Comment   Quad   4+/5     Hamstring   4+/5     Gastroc         Hip  flexion   4+/5     Hip abd   4+/5               Quad good good [email or web address] 8/29/23         Special Test Results/Comment   Meniscal Click     Crepitus (+) 90-0 pain throughout movement   Flexion Test     Valgus Laxity     Varus Laxity     Lachmans     Drop Back     Homans               9/13/23  Girth (cm) L R Post-Vaso   Mid Patella 41.0 44  42.5   Suprapatellar 42.8 44.2  44.0   5cm above 47.5 44.2 N/A   15cm above     N/A   Vasopneumatic compression applied to knee for significant edema, swelling, pain control. ICD-10 code: R60.9 Edema unspecified.       Reflexes/Sensation:               []Dermatomes/Myotomes intact               []Reflexes equal and normal bilaterally              []Other:     Joint mobility: PFJ 8/29/23              [x]Normal                      []Hypo               []Hyper     Palpation: NT     Functional Mobility/Transfers: modified independent with modifications in deeper knee flexion positions 10/6/23     Posture: knee alignment normal, moderate diffused edema present peripatellar 10/6/23     Bandages/Dressings/Incisions: healing well 10/6/23     Gait:WBAT, normalized mechanics, dec gait speed, no AD 10/6/23     Orthopedic Special Tests:      TEST INITIAL     8/22/23 FOLLOW  UP GOAL   SINGLE LEG STANCE TIME L: 1 sec     R: 3.42 sec   >25 SECONDS   6 MINUTE WALK TEST 0.19 miles                       M             F          60-68 y/o: >.31mile,  >.30mile  73-79 y/o:

## 2023-11-21 ENCOUNTER — HOSPITAL ENCOUNTER (OUTPATIENT)
Dept: PHYSICAL THERAPY | Age: 68
Setting detail: THERAPIES SERIES
Discharge: HOME | End: 2023-11-21
Payer: MEDICARE

## 2023-11-21 PROCEDURE — 97110 THERAPEUTIC EXERCISES: CPT | Performed by: PHYSICAL THERAPY ASSISTANT

## 2023-11-21 PROCEDURE — 97112 NEUROMUSCULAR REEDUCATION: CPT | Performed by: PHYSICAL THERAPY ASSISTANT

## 2023-11-21 PROCEDURE — 97016 VASOPNEUMATIC DEVICE THERAPY: CPT | Performed by: PHYSICAL THERAPY ASSISTANT

## 2023-11-21 NOTE — FLOWSHEET NOTE
navigation   [] (43040)Reviewed/Progressed HEP activities related to improving balance, coordination, kinesthetic sense, posture, motor skill, proprioception of core, proximal hip and LE for self care, mobility, lifting, and ambulation/stair navigation      Manual Treatments:  PROM / STM / Oscillations-Mobs:  G-I, II, III, IV (PA's, Inf., Post.)  [x] (57182) Provided manual therapy to mobilize LE, proximal hip and/or LS spine soft tissue/joints for the purpose of modulating pain, promoting relaxation,  increasing ROM, reducing/eliminating soft tissue swelling/inflammation/restriction, improving soft tissue extensibility and allowing for proper ROM for normal function with self care, mobility, lifting and ambulation. Modalities:  gameready 15'    Charges: KX MOD  Timed Code Treatment Minutes: 52'   Total Treatment Minutes: 1:40-3:30  110'       [] EVAL (LOW) 59550 (typically 20 minutes face-to-face)  [] EVAL (MOD) 24486 (typically 30 minutes face-to-face)  [] EVAL (HIGH) 68082 (typically 45 minutes face-to-face)  [] RE-EVAL   [x] ZT(98916) x 2     [] IONTO  [x] NMR (06132) x  1    [x] VASO  [] Manual (53037) x     [] Other:  [] TA x       [] Mech Traction (09694)  [] ES(attended) (37643)      [] ES (un) (10539): Hi-volt 20'    GOALS:    Patient stated goal: return to walking for exercise and hunting without pain/dysfunction. [x] Progressing: [] Met: [] Not Met: [] Adjusted     Therapist goals for Patient:   Short Term Goals: To be achieved in: 2 weeks  1. Independent in HEP and progression per patient tolerance, in order to prevent re-injury. [] Progressing: [x] Met: [] Not Met: [] Adjusted   2. Patient will have a decrease in pain to facilitate improvement in movement, function, and ADLs as indicated by Functional Deficits. [] Progressing: [x] Met: [] Not Met: [] Adjusted     Long Term Goals: To be achieved in:  8 weeks  1.  Disability index score of <25% deficit on LEFS to assist with reaching prior

## 2023-11-21 NOTE — FLOWSHEET NOTE
related to improving balance, coordination, kinesthetic sense, posture, motor skill, proprioception of core, proximal hip and LE for self care, mobility, lifting, and ambulation/stair navigation      Manual Treatments:  PROM / STM / Oscillations-Mobs:  G-I, II, III, IV (PA's, Inf., Post.)  [x] (19451) Provided manual therapy to mobilize LE, proximal hip and/or LS spine soft tissue/joints for the purpose of modulating pain, promoting relaxation,  increasing ROM, reducing/eliminating soft tissue swelling/inflammation/restriction, improving soft tissue extensibility and allowing for proper ROM for normal function with self care, mobility, lifting and ambulation. Modalities:  gameready 15'    Charges: KX MOD  Timed Code Treatment Minutes: 52'   Total Treatment Minutes: 1:05-2:55 110'       [] EVAL (LOW) 85478 (typically 20 minutes face-to-face)  [] EVAL (MOD) 51100 (typically 30 minutes face-to-face)  [] EVAL (HIGH) 63832 (typically 45 minutes face-to-face)  [] RE-EVAL   [x] DT(00453) x 2     [] IONTO  [x] NMR (23930) x  1    [x] VASO  [] Manual (09982) x     [] Other:  [] TA x       [] Mech Traction (93044)  [] ES(attended) (79426)      [] ES (un) (40051): Hi-volt 20'    GOALS:    Patient stated goal: return to walking for exercise and hunting without pain/dysfunction. [x] Progressing: [] Met: [] Not Met: [] Adjusted     Therapist goals for Patient:   Short Term Goals: To be achieved in: 2 weeks  1. Independent in HEP and progression per patient tolerance, in order to prevent re-injury. [] Progressing: [x] Met: [] Not Met: [] Adjusted   2. Patient will have a decrease in pain to facilitate improvement in movement, function, and ADLs as indicated by Functional Deficits. [] Progressing: [x] Met: [] Not Met: [] Adjusted     Long Term Goals: To be achieved in:  8 weeks  1. Disability index score of <25% deficit on LEFS to assist with reaching prior level of function.   [x] Progressing: [] Met: [] Not Met: []

## 2023-11-22 ENCOUNTER — APPOINTMENT (OUTPATIENT)
Dept: PHYSICAL THERAPY | Age: 68
End: 2023-11-22
Payer: MEDICARE

## 2023-11-27 ENCOUNTER — HOSPITAL ENCOUNTER (OUTPATIENT)
Dept: PHYSICAL THERAPY | Age: 68
Setting detail: THERAPIES SERIES
Discharge: HOME | End: 2023-11-27
Payer: MEDICARE

## 2023-11-27 PROCEDURE — 97016 VASOPNEUMATIC DEVICE THERAPY: CPT | Performed by: PHYSICAL THERAPY ASSISTANT

## 2023-11-27 PROCEDURE — 97112 NEUROMUSCULAR REEDUCATION: CPT | Performed by: PHYSICAL THERAPY ASSISTANT

## 2023-11-27 PROCEDURE — 97110 THERAPEUTIC EXERCISES: CPT | Performed by: PHYSICAL THERAPY ASSISTANT

## 2023-11-27 NOTE — FLOWSHEET NOTE
[facility] and [address] [place] [address] [address],  [facility], [address] [place] [address]  200 [facility], [facility] [address]  Phone: [phone]  Fax: [phone]    Physical Therapy Daily Treatment Note  Date:  2023    Patient Name:  Mari Mcknight    :  1955  MRN: [id number]  Restrictions/Precautions:    Medical/Treatment Diagnosis Information:  Arthrofibrosis of total knee arthroplasty, subsequent encounter [T84.82XD]  Treatment Diagnosis: M25.561 pain in right knee  Right knee SAIMA/medial-lateral retinacular release/CHASIDY   Insurance/Certification information:  PT Insurance Information: Medicare  Physician Information:  Shanti Olvera   Has the plan of care been signed (Y/N):        [x]  Yes  []  No     Date of Patient follow up with Physician: 10-10-23    Patient seen in consultation with Dr. Alton Pretty who established the initial/subsequent treatment protocol. 23: seen by Sharona Hernandez: aspiration performed today. Pt to hold on Eliquis for 2 days. Use double compression for edema control. Limit ROM to approx. 90 degrees for 2 weeks. Full ROM by 4 weeks. 23: seen by Dr Zev Mallory to resume Eliquis. Continue using double compression for edema control.   23: seen in MD office: aspiration    Is this a POC Report:     [x]  Yes  []  No        If Yes:  Date Range for reporting period:  Beginnin23  Ending: 10/6/23    POC report will be due (10 Rx or 30 days whichever is less): 23      Visit # Insurance Allowable Auth  medicare []  Yes [x]  No          OUTCOME MEASURE DATE SCORE   LEFS 23 62% deficit   LEFS 23 86% deficit   LEFS 23 64% deficit       Latex Allergy:  [x]NO      []YES  Preferred Language for Healthcare:   [x]English       []other:    Pain level:  3/10 infrequent medial knee pain     SUBJECTIVE: States he felt good leaving PT last session but continues to have increased soreness the

## 2023-11-28 ENCOUNTER — TELEPHONE (OUTPATIENT)
Dept: ORTHOPEDIC SURGERY | Age: 68
End: 2023-11-28

## 2023-11-28 NOTE — TELEPHONE ENCOUNTER
General Question     Subject: PRP QUESTIONS  Patient and /or Facility Request: Sandra Hansen  Contact Number: [phone]    PT HAS QUESTIONS ABOUT PRP INJ BEFORE APPT   PLEASE CLL PHN NUMBER LISTED ABOVE

## 2023-11-28 NOTE — TELEPHONE ENCOUNTER
Please review case and advise how to proceed. Pt is currently sched for appt on Thurs 12/14/23. Next PRP date after this is 12/18/23.

## 2023-11-29 ENCOUNTER — HOSPITAL ENCOUNTER (OUTPATIENT)
Dept: PHYSICAL THERAPY | Age: 68
Setting detail: THERAPIES SERIES
Discharge: HOME | End: 2023-11-29
Payer: MEDICARE

## 2023-11-29 PROCEDURE — 97110 THERAPEUTIC EXERCISES: CPT | Performed by: PHYSICAL THERAPY ASSISTANT

## 2023-11-29 PROCEDURE — G0283 ELEC STIM OTHER THAN WOUND: HCPCS | Performed by: PHYSICAL THERAPY ASSISTANT

## 2023-11-29 PROCEDURE — 97112 NEUROMUSCULAR REEDUCATION: CPT | Performed by: PHYSICAL THERAPY ASSISTANT

## 2023-11-29 NOTE — TELEPHONE ENCOUNTER
I left a voicemail for the patient and let him know that we usually do PRP consultations prior to scheduling the PRP itself. As he lives so far away, Dr. Villa Olson is willing to schedule him for a virtual visit to do the consultation. Upon his call back, he may be scheduled for a PRP consultation virtual visit.

## 2023-11-29 NOTE — FLOWSHEET NOTE
functional self-care, mobility, lifting and ambulation/stair navigation   [] (96343)Reviewed/Progressed HEP activities related to improving balance, coordination, kinesthetic sense, posture, motor skill, proprioception of core, proximal hip and LE for self care, mobility, lifting, and ambulation/stair navigation      Manual Treatments:  PROM / STM / Oscillations-Mobs:  G-I, II, III, IV (PA's, Inf., Post.)  [x] (44500) Provided manual therapy to mobilize LE, proximal hip and/or LS spine soft tissue/joints for the purpose of modulating pain, promoting relaxation,  increasing ROM, reducing/eliminating soft tissue swelling/inflammation/restriction, improving soft tissue extensibility and allowing for proper ROM for normal function with self care, mobility, lifting and ambulation. Modalities:  estim- pre mod (med/lat knee) 15'    Charges: KX MOD  Timed Code Treatment Minutes: 52'   Total Treatment Minutes: 1:15- 3:15 120'       [] EVAL (LOW) 47611 (typically 20 minutes face-to-face)  [] EVAL (MOD) 72441 (typically 30 minutes face-to-face)  [] EVAL (HIGH) 60084 (typically 45 minutes face-to-face)  [] RE-EVAL   [x] GT(39658) x 2     [] IONTO  [x] NMR (58112) x  1    [] VASO  [] Manual (74226) x     [] Other:  [] TA x       [] Mech Traction (86610)  [] ES(attended) (98711)      [x] ES (un) (60508): Hi-volt 20'    GOALS:    Patient stated goal: return to walking for exercise and hunting without pain/dysfunction. [x] Progressing: [] Met: [] Not Met: [] Adjusted     Therapist goals for Patient:   Short Term Goals: To be achieved in: 2 weeks  1. Independent in HEP and progression per patient tolerance, in order to prevent re-injury. [] Progressing: [x] Met: [] Not Met: [] Adjusted   2. Patient will have a decrease in pain to facilitate improvement in movement, function, and ADLs as indicated by Functional Deficits. [] Progressing: [x] Met: [] Not Met: [] Adjusted     Long Term Goals:  To be achieved in:  8

## 2023-11-30 ENCOUNTER — TELEPHONE (OUTPATIENT)
Dept: ORTHOPEDIC SURGERY | Age: 68
End: 2023-11-30

## 2023-11-30 ENCOUNTER — APPOINTMENT (OUTPATIENT)
Dept: PHYSICAL THERAPY | Age: 68
End: 2023-11-30
Payer: MEDICARE

## 2023-11-30 NOTE — TELEPHONE ENCOUNTER
Appointment Request     Patient requesting earlier appointment: No  Appointment offered to patient: 12/14/2023  Patient Contact Number: [phone]      THE PT CALLED TO SAY THAT HE WANTS TO KEEP HIS APPT ON 12/14 AS AN IN PERSON PATIENT, BECAUSE HE WILL BE HERE ON THAT DAY ANYWAY. HE'S BEEN TRAVELING HERE 3 TIMES A WEEK FOR DIFFERENT APPTS.

## 2023-12-04 ENCOUNTER — HOSPITAL ENCOUNTER (OUTPATIENT)
Dept: PHYSICAL THERAPY | Age: 68
Setting detail: THERAPIES SERIES
Discharge: HOME | End: 2023-12-04
Payer: MEDICARE

## 2023-12-04 PROCEDURE — 97112 NEUROMUSCULAR REEDUCATION: CPT | Performed by: PHYSICAL THERAPY ASSISTANT

## 2023-12-04 PROCEDURE — 97016 VASOPNEUMATIC DEVICE THERAPY: CPT | Performed by: PHYSICAL THERAPY ASSISTANT

## 2023-12-04 PROCEDURE — 97110 THERAPEUTIC EXERCISES: CPT | Performed by: PHYSICAL THERAPY ASSISTANT

## 2023-12-04 NOTE — FLOWSHEET NOTE
[facility] and [address] [place] [address] [address],  [facility], [address] [place] [address]  200 [facility], [address] [place] [address] Avenue  Phone: [phone]  Fax: [phone]    Physical Therapy Daily Treatment Note  Date:  2023    Patient Name:  Robert Mejía    :  1955  MRN: [id number]  Restrictions/Precautions:    Medical/Treatment Diagnosis Information:  Arthrofibrosis of total knee arthroplasty, subsequent encounter [T84.82XD]  Treatment Diagnosis: M25.561 pain in right knee  Right knee SAIMA/medial-lateral retinacular release/CHASIDY 4/15/13  Insurance/Certification information:  PT Insurance Information: Medicare  Physician Information:  Imelda Franco   Has the plan of care been signed (Y/N):        [x]  Yes  []  No     Date of Patient follow up with Physician: 23 Dr Hannah Souza,  23 Dr. Madalyn Singletary    Patient seen in consultation with Dr. Hannah Souza who established the initial/subsequent treatment protocol. 23: seen by Julia Reis: aspiration performed today. Pt to hold on Eliquis for 2 days. Use double compression for edema control. Limit ROM to approx. 90 degrees for 2 weeks. Full ROM by 4 weeks. 23: seen by Dr Momo Norton to resume Eliquis. Continue using double compression for edema control.   23: seen in MD office: aspiration    Is this a POC Report:     [x]  Yes  []  No        If Yes:  Date Range for reporting period:  Beginnin23  Ending: 10/6/23    POC report will be due (10 Rx or 30 days whichever is less): 23      Visit # Insurance Allowable Auth Required   24 medicare []  Yes [x]  No          OUTCOME MEASURE DATE SCORE   LEFS 23 62% deficit   LEFS 23 86% deficit   LEFS 23 64% deficit       Latex Allergy:  [x]NO      []YES  Preferred Language for Healthcare:   [x]English       []other:    Pain level:  3/10 infrequent medial knee pain     SUBJECTIVE: States he felt really good after last visit- no residual What Type Of Note Output Would You Prefer (Optional)?: Standard Output What Is The Reason For Today's Visit?: Full Body Skin Examination What Is The Reason For Today's Visit? (Being Monitored For X): concerning skin lesions on an annual basis Additional History: Patient has a history of skin cancer. She has a rough raised spot of concern on her scalp.

## 2023-12-06 ENCOUNTER — HOSPITAL ENCOUNTER (OUTPATIENT)
Dept: PHYSICAL THERAPY | Age: 68
Setting detail: THERAPIES SERIES
Discharge: HOME | End: 2023-12-06
Payer: MEDICARE

## 2023-12-06 PROCEDURE — 97112 NEUROMUSCULAR REEDUCATION: CPT | Performed by: PHYSICAL THERAPY ASSISTANT

## 2023-12-06 PROCEDURE — 97110 THERAPEUTIC EXERCISES: CPT | Performed by: PHYSICAL THERAPY ASSISTANT

## 2023-12-07 ENCOUNTER — HOSPITAL ENCOUNTER (OUTPATIENT)
Dept: PHYSICAL THERAPY | Age: 68
Setting detail: THERAPIES SERIES
Discharge: HOME | End: 2023-12-07
Payer: MEDICARE

## 2023-12-07 PROCEDURE — 97112 NEUROMUSCULAR REEDUCATION: CPT | Performed by: PHYSICAL THERAPY ASSISTANT

## 2023-12-07 PROCEDURE — 97110 THERAPEUTIC EXERCISES: CPT | Performed by: PHYSICAL THERAPY ASSISTANT

## 2023-12-11 ENCOUNTER — HOSPITAL ENCOUNTER (OUTPATIENT)
Dept: PHYSICAL THERAPY | Age: 68
Setting detail: THERAPIES SERIES
Discharge: HOME | End: 2023-12-11
Payer: MEDICARE

## 2023-12-11 PROCEDURE — 97112 NEUROMUSCULAR REEDUCATION: CPT | Performed by: PHYSICAL THERAPY ASSISTANT

## 2023-12-11 PROCEDURE — 97110 THERAPEUTIC EXERCISES: CPT | Performed by: PHYSICAL THERAPY ASSISTANT

## 2023-12-11 SDOH — HEALTH STABILITY: PHYSICAL HEALTH: ON AVERAGE, HOW MANY MINUTES DO YOU ENGAGE IN EXERCISE AT THIS LEVEL?: 0 MIN

## 2023-12-11 SDOH — HEALTH STABILITY: PHYSICAL HEALTH: ON AVERAGE, HOW MANY DAYS PER WEEK DO YOU ENGAGE IN MODERATE TO STRENUOUS EXERCISE (LIKE A BRISK WALK)?: 0 DAYS

## 2023-12-11 NOTE — FLOWSHEET NOTE
Functional Deficits. [] Progressing: [x] Met: [] Not Met: [] Adjusted     Long Term Goals: To be achieved in:  8 weeks  1. Disability index score of <25% deficit on LEFS to assist with reaching prior level of function. [x] Progressing: [] Met: [] Not Met: [] Adjusted  2. Patient will demonstrate increased AROM to 0-135 to allow for proper joint functioning as indicated by patients Functional Deficits. [x] Progressing: [] Met: [] Not Met: [] Adjusted  3. Patient will demonstrate an increase in Strength to good proximal hip strength and control, within 5lb HHD in LE to allow for proper functional mobility as indicated by patients Functional Deficits. [x] Progressing: [] Met: [] Not Met: [] Adjusted  4. Patient will return to all functional activities without increased symptoms or restriction. [] Progressing: [] Met: [] Not Met: [] Adjusted  5. Patient will be able to ambulate > 30 minutes with normal gait without AD/pain or dysfunction. [x] Progressing: [] Met: [] Not Met: [] Adjusted  6. Patient will be able to manage a flight of stairs with normal gait without AD/pain or dysfunction. [x] Progressing: [] Met: [] Not Met: [] Adjusted                     Overall Progression Towards Functional goals/ Treatment Progress Update:  [] Patient is progressing as expected towards functional goals listed. [x] Progression is slowed due to complexities/Impairments listed. [] Progression has been slowed due to co-morbidities.   [] Plan just implemented, too soon to assess goals progression <30days   [] Goals require adjustment due to lack of progress  [] Patient is not progressing as expected and requires additional follow up with physician  [x] Other: arthrofibrosis    Prognosis for POC: [x] Good [] Fair  [] Poor      Patient requires continued skilled intervention: [x] Yes  [] No    Treatment/Activity Tolerance:  [x] Patient able to complete treatment  [] Patient limited by fatigue  [] Patient limited by pain

## 2023-12-12 ENCOUNTER — OFFICE VISIT (OUTPATIENT)
Dept: ORTHOPEDIC SURGERY | Age: 68
End: 2023-12-12
Payer: MEDICARE

## 2023-12-12 DIAGNOSIS — T84.82XD ARTHROFIBROSIS OF TOTAL KNEE ARTHROPLASTY, SUBSEQUENT ENCOUNTER: Primary | ICD-10-CM

## 2023-12-12 PROCEDURE — 1036F TOBACCO NON-USER: CPT | Performed by: ORTHOPAEDIC SURGERY

## 2023-12-12 PROCEDURE — 99214 OFFICE O/P EST MOD 30 MIN: CPT | Performed by: ORTHOPAEDIC SURGERY

## 2023-12-12 PROCEDURE — G8428 CUR MEDS NOT DOCUMENT: HCPCS | Performed by: ORTHOPAEDIC SURGERY

## 2023-12-12 PROCEDURE — G8484 FLU IMMUNIZE NO ADMIN: HCPCS | Performed by: ORTHOPAEDIC SURGERY

## 2023-12-12 PROCEDURE — G8417 CALC BMI ABV UP PARAM F/U: HCPCS | Performed by: ORTHOPAEDIC SURGERY

## 2023-12-12 PROCEDURE — 1124F ACP DISCUSS-NO DSCNMKR DOCD: CPT | Performed by: ORTHOPAEDIC SURGERY

## 2023-12-12 PROCEDURE — 3017F COLORECTAL CA SCREEN DOC REV: CPT | Performed by: ORTHOPAEDIC SURGERY

## 2023-12-12 NOTE — PROGRESS NOTES
Chief Complaint    Knee Pain (Ck rt knee)      History of Present Illness:  Thersa Bernheim is a 79 y.o. male who presents for follow up of right knee(s). Patient has extensive history of treatment on his right knee which includes a medial unicompartmental knee arthoplasty 12/8/2022 in [place] and anterior synovectomy and extensor mechanism realignment on 8/28/2023 by Dr. Lissett Deleon. Patient experienced hematoma of his knee following surgery that required an aspiration on 8/29/2023 and 9/5/2023. Patient presents today experiencing kneecap pain. He gets stiffness before walking that he is able to loosen up. He still experiences sharp pain and has trouble going down stairs. He experiences soreness after using the ERMI. He admits he is better since his surgery but he is still dissatisfied and can't walk long distance. He realizes and has been told by other physicians that he does have associated patellofemoral arthritis that is symptomatic for him. The initial goal was to relieve the extensive scar tissue present adjacent and about the patellofemoral joint from the prior surgery and to see if this would diminish his symptoms. He is going to try an additional period of nonoperative treatment however states that at some close future time he wishes to consider an arthroplasty procedure if he is still symptomatic. Patient will be seeing Dr. Phuong Byers for a PRP injection on 12/14.       Pain Assessment:  Location: right  Level: 2 out of 10  Duration: Months  Description: sharp  Result of an injury: Yes  Work related injury: No  Aggravating actions: Step ups, ERMI  Relieving Factors: rest  Wants injections: Yes     Past Medical History:   Diagnosis Date    A-fib ([address])     Arthritis     CAD (coronary artery disease)     PT SAYS HAS BLOCKAGES, NEVER STENTED    Chronic prostatitis     Diabetes mellitus ([address])     PRE    Heart disease     Hx of blood clots     4 CLOTS LUNG & LEG    Hyperlipidemia     Hypertension

## 2023-12-14 ENCOUNTER — HOSPITAL ENCOUNTER (OUTPATIENT)
Dept: PHYSICAL THERAPY | Age: 68
Setting detail: THERAPIES SERIES
Discharge: HOME | End: 2023-12-14
Payer: MEDICARE

## 2023-12-14 ENCOUNTER — OFFICE VISIT (OUTPATIENT)
Dept: ORTHOPEDIC SURGERY | Age: 68
End: 2023-12-14
Payer: MEDICARE

## 2023-12-14 DIAGNOSIS — Z79.01 CHRONIC ANTICOAGULATION: ICD-10-CM

## 2023-12-14 DIAGNOSIS — T84.82XD ARTHROFIBROSIS OF TOTAL KNEE ARTHROPLASTY, SUBSEQUENT ENCOUNTER: Primary | ICD-10-CM

## 2023-12-14 PROCEDURE — G8484 FLU IMMUNIZE NO ADMIN: HCPCS | Performed by: INTERNAL MEDICINE

## 2023-12-14 PROCEDURE — 1124F ACP DISCUSS-NO DSCNMKR DOCD: CPT | Performed by: INTERNAL MEDICINE

## 2023-12-14 PROCEDURE — 1036F TOBACCO NON-USER: CPT | Performed by: INTERNAL MEDICINE

## 2023-12-14 PROCEDURE — 99204 OFFICE O/P NEW MOD 45 MIN: CPT | Performed by: INTERNAL MEDICINE

## 2023-12-14 PROCEDURE — G8428 CUR MEDS NOT DOCUMENT: HCPCS | Performed by: INTERNAL MEDICINE

## 2023-12-14 PROCEDURE — 97110 THERAPEUTIC EXERCISES: CPT | Performed by: PHYSICAL THERAPIST

## 2023-12-14 PROCEDURE — G8417 CALC BMI ABV UP PARAM F/U: HCPCS | Performed by: INTERNAL MEDICINE

## 2023-12-14 PROCEDURE — 3017F COLORECTAL CA SCREEN DOC REV: CPT | Performed by: INTERNAL MEDICINE

## 2023-12-14 PROCEDURE — 97112 NEUROMUSCULAR REEDUCATION: CPT | Performed by: PHYSICAL THERAPIST

## 2023-12-14 NOTE — PLAN OF CARE
[facility] and [address] [place] [address] [address],  [facility], [address] 201 [facility]  200 [facility], [address] [place] [address] Avenue  Phone: [phone]  Fax: [phone]    Physical Therapy Re-Certification Plan of Care    Dear Debbi Rodriguez  ,    We had the pleasure of treating the following patient for physical therapy services at [address] [place] [address]. A summary of our findings can be found in the updated assessment below. This includes our plan of care. If you have any questions or concerns regarding these findings, please do not hesitate to contact me at the office phone number checked above. Thank you for the referral.     Physician Signature:________________________________Date:__________________  By signing above (or electronic signature), therapist's plan is approved by physician      Overall Response to Treatment:   [x]Patient is responding well to treatment and improvement is noted with regards to goals   []Patient should continue to improve in reasonable time if they continue HEP   []Patient has plateaued and is no longer responding to skilled PT intervention    []Patient is getting worse and would benefit from return to referring MD   []Patient unable to adhere to initial POC   []Other: Total Visits: 32     Recommendation:    [x] Continue PT 3x / wk for 4-6 weeks. [] Hold PT, pending MD visit   [] Discharge to HEP. Follow up with PT or MD PRN.       Date:  2023    Patient Name:  Boston Dean    :  1955  MRN: [id number]  Restrictions/Precautions:    Medical/Treatment Diagnosis Information:  Arthrofibrosis of total knee arthroplasty, subsequent encounter [T84.82XD]  Treatment Diagnosis: M25.561 pain in right knee  Right knee SAIMA/medial-lateral retinacular release/CHASIDY 19  Insurance/Certification information:  PT Insurance Information: Medicare  Physician Information:  Adri Ren   Has the plan of care

## 2023-12-15 NOTE — PROGRESS NOTES
were no vitals filed for this visit. General Exam:     Constitutional: Patient is adequately groomed with no evidence of malnutrition  Mental Status: The patient is oriented to time, place and person. The patient's mood and affect are appropriate. Vascular: Examination reveals no swelling or calf tenderness. Peripheral pulses are palpable and 2+. Lymphatics: no lymphadenopathy of the inguinal region or lower extremity      Physical Exam: left knee      Primary Exam:    Inspection: Mild asymmetric soft tissue swelling, trace effusion no deformity      Palpation: No focal tenderness      Range of Motion: 0/120      Strength: Normal with SLR      Special Tests: Negative extensor lag; Lachman test negative, collateral ligament stressing stable, anterior/posterior drawer negative, medial and lateral [facility] testing negative, patella femoral provacative Negative      Skin: Well-healed vertical incision over the extensor knee. There are no rashes, ulcerations or lesions. Gait: Nonantalgic      Reflex intact lower     Additional Comments:        Additional Examinations:           Left Lower Extremity: Examination of the left lower extremity does not show any tenderness, deformity or injury. Range of motion is unremarkable. There is no gross instability. There are no rashes, ulcerations or lesions. Strength and tone are normal.   Neurolgic -Light touch sensation and manual muscle testing normal L2-S1. No fasiculations. Pattella tendon and Achilles tendon reflexes +2 bilaterally. Seated SLR negative        Office Imaging Results/Procedures PerformedToday:           Office Procedures:   No orders of the defined types were placed in this encounter.           Other Outside Imaging and Testing Personally Reviewed:      [facility] [address]  Order: [id number]  Impression    Stable changes from right medial unicompartmental knee arthroplasty with intact hardware and unchanged

## 2023-12-18 ENCOUNTER — APPOINTMENT (OUTPATIENT)
Dept: PHYSICAL THERAPY | Age: 68
End: 2023-12-18
Payer: MEDICARE

## 2023-12-21 ENCOUNTER — APPOINTMENT (OUTPATIENT)
Dept: PHYSICAL THERAPY | Age: 68
End: 2023-12-21
Payer: MEDICARE

## 2023-12-26 ENCOUNTER — HOSPITAL ENCOUNTER (OUTPATIENT)
Dept: PHYSICAL THERAPY | Age: 68
Setting detail: THERAPIES SERIES
Discharge: HOME | End: 2023-12-26
Payer: MEDICARE

## 2023-12-26 PROCEDURE — 97110 THERAPEUTIC EXERCISES: CPT | Performed by: PHYSICAL THERAPY ASSISTANT

## 2023-12-26 PROCEDURE — 97112 NEUROMUSCULAR REEDUCATION: CPT | Performed by: PHYSICAL THERAPY ASSISTANT

## 2023-12-26 NOTE — FLOWSHEET NOTE
[facility] and [address] [place] [address] [address],  [facility], [address] [place] [address]  200 [facility], [address] [place] [address] Avenue  Phone: [phone]  Fax: [phone]      Date:  2023    Patient Name:  Sylvia Rizzo    :  1955  MRN: [id number]  Restrictions/Precautions:    Medical/Treatment Diagnosis Information:  Arthrofibrosis of total knee arthroplasty, subsequent encounter [T84.82XD]  Treatment Diagnosis: M25.561 pain in right knee  Right knee SAIMA/medial-lateral retinacular release/CHASIDY   Insurance/Certification information:  PT Insurance Information: Medicare  Physician Information:  Maureen Prince   Has the plan of care been signed (Y/N):        [x]  Yes  []  No     Date of Patient follow up with Physician: 23 Dr Elvia Lopez,  23 Dr. Deepti Freedman    Patient seen in consultation with Dr. Elvia Lopez who established the initial/subsequent treatment protocol. 23: seen by Marianna Jones: aspiration performed today. Pt to hold on Eliquis for 2 days. Use double compression for edema control. Limit ROM to approx. 90 degrees for 2 weeks. Full ROM by 4 weeks. 23: seen by Dr Aditya Manzanares to resume Eliquis. Continue using double compression for edema control.   23: seen in MD office: aspiration    Is this a POC Report:     []  Yes  [x]  No        If Yes:  Date Range for reporting period:  Beginning: 10/6/23  Endin23    POC report will be due (10 Rx or 30 days whichever is less): 24      Visit # Insurance Allowable Auth Required   30 medicare []  Yes [x]  No          OUTCOME MEASURE DATE SCORE   LEFS 23 62% deficit   LEFS 23 86% deficit   LEFS 23 64% deficit   LEFS 23 59% deficit       Latex Allergy:  [x]NO      []YES  Preferred Language for Healthcare:   [x]English       []other:    Pain level:  3/10 infrequent medial knee pain     SUBJECTIVE: PRP injection  and reports immediate relief that lasted

## 2023-12-28 ENCOUNTER — APPOINTMENT (OUTPATIENT)
Dept: PHYSICAL THERAPY | Age: 68
End: 2023-12-28
Payer: MEDICARE

## 2024-01-02 ENCOUNTER — HOSPITAL ENCOUNTER (OUTPATIENT)
Dept: PHYSICAL THERAPY | Age: 69
Setting detail: THERAPIES SERIES
Discharge: HOME | End: 2024-01-02
Payer: MEDICARE

## 2024-01-02 PROCEDURE — 97112 NEUROMUSCULAR REEDUCATION: CPT | Performed by: PHYSICAL THERAPIST

## 2024-01-02 PROCEDURE — 97110 THERAPEUTIC EXERCISES: CPT | Performed by: PHYSICAL THERAPIST

## 2024-01-02 NOTE — FLOWSHEET NOTE
[facility] [place],  Sports Performance and Rehabilitation, [address] [address]  Phone: [phone]  Fax: [phone]      Date:  2024    Patient Name:  Melvin Morrow    :  1955  MRN: [id number]  Restrictions/Precautions:    Medical/Treatment Diagnosis Information:  Arthrofibrosis of total knee arthroplasty, subsequent encounter [T84.82XD]  Treatment Diagnosis: M25.561 pain in right knee  Right knee SAIMA/medial-lateral retinacular release/CHASIDY 23  Insurance/Certification information:  PT Insurance Information: Medicare  Physician Information:  Xavier Johnson   Has the plan of care been signed (Y/N):        [x]  Yes  []  No     Date of Patient follow up with Physician: 23 Dr Johnson,  23 Dr. Valle    Patient seen in consultation with Dr. Johnson who established the initial/subsequent treatment protocol.   23: seen by Pj: aspiration performed today.  Pt to hold on Eliquis for 2 days.  Use double compression for edema control.  Limit ROM to approx. 90 degrees for 2 weeks.  Full ROM by 4 weeks.  23: seen by Dr Coffey to resume Eliquis.  Continue using double compression for edema control.  23: seen in MD office: aspiration    Is this a POC Report:     []  Yes  [x]  No        If Yes:  Date Range for reporting period:  Beginning: 10/6/23  Endin23    POC report will be due (10 Rx or 30 days whichever is less): 24      Visit # Insurance Allowable Auth Required   31 medicare []  Yes [x]  No          OUTCOME MEASURE DATE SCORE   LEFS 23 62% deficit   LEFS 23 86% deficit   LEFS 23 64% deficit   LEFS 23 59% deficit       Latex Allergy:  [x]NO      []YES  Preferred Language for Healthcare:   [x]English       []other:    Pain level:  3/10 infrequent medial knee pain     SUBJECTIVE: PRP injection  and reports immediate relief that has

## 2024-01-04 ENCOUNTER — OFFICE VISIT (OUTPATIENT)
Dept: ORTHOPEDIC SURGERY | Age: 69
End: 2024-01-04

## 2024-01-04 VITALS — BODY MASS INDEX: 30.66 KG/M2 | HEIGHT: 69 IN | WEIGHT: 207.01 LBS

## 2024-01-04 DIAGNOSIS — T84.82XD ARTHROFIBROSIS OF TOTAL KNEE ARTHROPLASTY, SUBSEQUENT ENCOUNTER: Primary | ICD-10-CM

## 2024-01-04 NOTE — PROGRESS NOTES
Chief Complaint:   Chief Complaint   Patient presents with    Knee Pain     right, already feeling improvement, lateral knee pain and swelling is gone, and ant knee is improving, 2nd PRP today            History of Present Illness:       Patient is a 68 y.o. male returns follow up for the above complaint. The patient was last seen approximately 2 weeks ago.  The symptoms have shown improvement.    W OM AC 45    Denies constitutional symptoms    He would like to proceed with repeat [id number] injection previously discussed    He has noted interval decrease swelling and no reaccumulation status post aspiration and injection of [id number] on last visit       Past Medical History:        Past Medical History:   Diagnosis Date    A-fib (HCC)     Arthritis     CAD (coronary artery disease)     PT SAYS HAS BLOCKAGES, NEVER STENTED    Chronic prostatitis     Diabetes mellitus (HCC)     PRE    Heart disease     Hx of blood clots     4 CLOTS LUNG & LEG    Hyperlipidemia     Hypertension     Polycythemia     DOES PHLEBOTOMY EVERY MONTH & BEFORE SURGERIES, PT SCHEDULED TO HAVE 8/25/23 DR MISTY HINKLE    Sleep apnea     CAN;T TOLERATE CPAP    SOB (shortness of breath) on exertion     \"RUNNERS ARRYTHMIA\"        Present Medications:         Current Outpatient Medications   Medication Sig Dispense Refill    apixaban (ELIQUIS) 5 MG TABS tablet Take 1 tablet by mouth 2 times daily for 1 day 2 tablet 0    senna (SENOKOT) 8.6 MG TABS tablet Take 1 tablet by mouth daily 30 tablet 0    ondansetron (ZOFRAN) 4 MG tablet Take 1 tablet by mouth 3 times daily as needed for Nausea or Vomiting 15 tablet 0    tadalafil (CIALIS) 20 MG tablet       nitroGLYCERIN (NITROSTAT) 0.4 MG SL tablet PT SAYS HAS, NEVER USED, PRN      methocarbamol (ROBAXIN) 500 MG tablet Take 1 tablet by mouth as needed      diclofenac (VOLTAREN) 75 MG EC tablet Take 1 tablet by mouth as needed      colesevelam (WELCHOL) 625 MG tablet TAKE 3 TABLETS BY MOUTH

## 2024-01-05 ENCOUNTER — APPOINTMENT (OUTPATIENT)
Dept: PHYSICAL THERAPY | Age: 69
End: 2024-01-05
Payer: MEDICARE

## 2024-01-09 ENCOUNTER — HOSPITAL ENCOUNTER (OUTPATIENT)
Dept: PHYSICAL THERAPY | Age: 69
Setting detail: THERAPIES SERIES
Discharge: HOME | End: 2024-01-09
Payer: MEDICARE

## 2024-01-09 PROCEDURE — 97112 NEUROMUSCULAR REEDUCATION: CPT | Performed by: PHYSICAL THERAPY ASSISTANT

## 2024-01-09 PROCEDURE — 97110 THERAPEUTIC EXERCISES: CPT | Performed by: PHYSICAL THERAPY ASSISTANT

## 2024-01-09 NOTE — FLOWSHEET NOTE
visit [] Discharge  2x/week for 4-6 weeks.  Monitor ROM changes      Electronically signed by:  Karlee Saldana PTA      Note: If patient does not return for scheduled/ recommended follow up visits, this note will serve as a discharge from care along with most recent update on progress.

## 2024-01-11 ENCOUNTER — HOSPITAL ENCOUNTER (OUTPATIENT)
Dept: PHYSICAL THERAPY | Age: 69
Setting detail: THERAPIES SERIES
Discharge: HOME | End: 2024-01-11
Payer: MEDICARE

## 2024-01-11 PROCEDURE — 97110 THERAPEUTIC EXERCISES: CPT | Performed by: PHYSICAL THERAPY ASSISTANT

## 2024-01-11 PROCEDURE — 97112 NEUROMUSCULAR REEDUCATION: CPT | Performed by: PHYSICAL THERAPY ASSISTANT

## 2024-01-16 ENCOUNTER — APPOINTMENT (OUTPATIENT)
Dept: PHYSICAL THERAPY | Age: 69
End: 2024-01-16
Payer: MEDICARE

## 2024-01-18 ENCOUNTER — APPOINTMENT (OUTPATIENT)
Dept: PHYSICAL THERAPY | Age: 69
End: 2024-01-18
Payer: MEDICARE

## 2024-01-23 ENCOUNTER — HOSPITAL ENCOUNTER (OUTPATIENT)
Dept: PHYSICAL THERAPY | Age: 69
Setting detail: THERAPIES SERIES
Discharge: HOME | End: 2024-01-23
Payer: MEDICARE

## 2024-01-23 PROCEDURE — 97110 THERAPEUTIC EXERCISES: CPT | Performed by: PHYSICAL THERAPY ASSISTANT

## 2024-01-23 PROCEDURE — 97112 NEUROMUSCULAR REEDUCATION: CPT | Performed by: PHYSICAL THERAPY ASSISTANT

## 2024-01-23 NOTE — FLOWSHEET NOTE
care been signed (Y/N):        [x]  Yes  []  No     Date of Patient follow up with Physician: 23 Dr Johnson,  23 Dr. Valle    Patient seen in consultation with Dr. Johnson who established the initial/subsequent treatment protocol.   23: seen by Pj: aspiration performed today.  Pt to hold on Eliquis for 2 days.  Use double compression for edema control.  Limit ROM to approx. 90 degrees for 2 weeks.  Full ROM by 4 weeks.  23: seen by Dr Coffey to resume Eliquis.  Continue using double compression for edema control.  23: seen in MD office: aspiration    Is this a POC Report:     []  Yes  [x]  No        If Yes:  Date Range for reporting period:  Beginning: 10/6/23  Endin23    POC report will be due (10 Rx or 30 days whichever is less): 24      Visit # Insurance Allowable Auth Required   34 medicare []  Yes [x]  No          OUTCOME MEASURE DATE SCORE   LEFS 23 62% deficit   LEFS 23 86% deficit   LEFS 23 64% deficit   LEFS 23 59% deficit       Latex Allergy:  [x]NO      []YES  Preferred Language for Healthcare:   [x]English       []other:    Pain level:  3/10 infrequent medial knee pain     SUBJECTIVE: Reports knee pain when walking barefoot but current shoes really help. Today knee feels pretty good. Planning to take another walk this weekend.      OBJECTIVE:      Flexibility L R Comment   Hamstring    90/90 lacking 15 deg  23   Gastroc         ITB         Quad                      24          ROM PROM AROM Overpressure Comment     L R L R L R     Flexion          123 cold  125 ERMI    Extension       0    +1                                             23  Strength L R Comment   Quad   4+/5     Hamstring   4+/5     Gastroc         Hip  flexion   4+/5     Hip abd   5/5               Quad good good DAVID@0          Special Test Results/Comment   Meniscal Click     Crepitus (+) 90-0 pain throughout movement   Flexion Test     Valgus Laxity   
navigation   [] (48005)Reviewed/Progressed HEP activities related to improving balance, coordination, kinesthetic sense, posture, motor skill, proprioception of core, proximal hip and LE for self care, mobility, lifting, and ambulation/stair navigation      Manual Treatments:  PROM / STM / Oscillations-Mobs:  G-I, II, III, IV (PA's, Inf., Post.)  [] (45552) Provided manual therapy to mobilize LE, proximal hip and/or LS spine soft tissue/joints for the purpose of modulating pain, promoting relaxation,  increasing ROM, reducing/eliminating soft tissue swelling/inflammation/restriction, improving soft tissue extensibility and allowing for proper ROM for normal function with self care, mobility, lifting and ambulation.     Modalities:   Charges:   Timed Code Treatment Minutes: 50'   Total Treatment Minutes: 1:00-2:30  90'       [] EVAL (LOW) 20113 (typically 20 minutes face-to-face)  [] EVAL (MOD) 88019 (typically 30 minutes face-to-face)  [] EVAL (HIGH) 73743 (typically 45 minutes face-to-face)  [] RE-EVAL   [x] TE(14538) x 2     [] IONTO  [x] NMR (97746) x  1    [] VASO  [] Manual (19349) x     [] Other:  [] TA x       [] Mech Traction (18865)  [] ES(attended) (04739)      [] ES (un) (52215): Hi-volt 15'    GOALS: 12/14/23    Patient stated goal: return to walking for exercise and hunting without pain/dysfunction.    [x] Progressing: [] Met: [x] Not Met: [] Adjusted     Therapist goals for Patient:   Short Term Goals: To be achieved in: 2 weeks  1. Independent in HEP and progression per patient tolerance, in order to prevent re-injury.     [] Progressing: [x] Met: [] Not Met: [] Adjusted   2. Patient will have a decrease in pain to facilitate improvement in movement, function, and ADLs as indicated by Functional Deficits.    [] Progressing: [x] Met: [] Not Met: [] Adjusted     Long Term Goals: To be achieved in:  8 weeks  1. Disability index score of <25% deficit on LEFS to assist with reaching prior level of

## 2024-01-25 ENCOUNTER — HOSPITAL ENCOUNTER (OUTPATIENT)
Dept: PHYSICAL THERAPY | Age: 69
Setting detail: THERAPIES SERIES
Discharge: HOME | End: 2024-01-25
Payer: MEDICARE

## 2024-01-25 PROCEDURE — 97112 NEUROMUSCULAR REEDUCATION: CPT | Performed by: PHYSICAL THERAPY ASSISTANT

## 2024-01-25 PROCEDURE — 97110 THERAPEUTIC EXERCISES: CPT | Performed by: PHYSICAL THERAPY ASSISTANT

## 2024-01-25 NOTE — PLAN OF CARE
been signed (Y/N):        [x]  Yes  []  No     Date of Patient follow up with Physician: 23 Dr Johnson,  23 Dr. Valle    Patient seen in consultation with Dr. Johnson who established the initial/subsequent treatment protocol.   23: seen by Pj: aspiration performed today.  Pt to hold on Eliquis for 2 days.  Use double compression for edema control.  Limit ROM to approx. 90 degrees for 2 weeks.  Full ROM by 4 weeks.  23: seen by Dr Johnson-beny to resume Eliquis.  Continue using double compression for edema control.  23: seen in MD office: aspiration    Is this a POC Report:     [x]  Yes  []  No        If Yes:  Date Range for reporting period:  Beginnin23  Endin24    POC report will be due (10 Rx or 30 days whichever is less): 24      Visit # Insurance Allowable Auth Required   35 medicare []  Yes [x]  No          OUTCOME MEASURE DATE SCORE   LEFS 23 62% deficit   LEFS 23 86% deficit   LEFS 23 64% deficit   LEFS 23 59% deficit   LEFS 24 67% deficit            Latex Allergy:  [x]NO      []YES  Preferred Language for Healthcare:   [x]English       []other:    Pain level:  4-5/10 infrequent medial knee pain     SUBJECTIVE:  Patient reports increased soreness following previous session.  Pain awakens him at night.  He reports increased tightness of quad and patellar tendon when pointing to his anterior knee.  He mentions pain of his medial knee with activity.  He thinks that the step downs and knee extension machine could have caused an increase in symptoms.  He notices increased swelling since Tuesday.       OBJECTIVE:      Flexibility L R Comment   Hamstring    90/90 lacking 15 deg  23   Gastroc         ITB         Quad                      24          ROM PROM AROM Overpressure Comment     L R L R L R     Flexion       115 cold   125 ERMI    Extension       0    +1                                             24  Strength L R

## 2024-01-30 ENCOUNTER — HOSPITAL ENCOUNTER (OUTPATIENT)
Dept: PHYSICAL THERAPY | Age: 69
Setting detail: THERAPIES SERIES
Discharge: HOME | End: 2024-01-30
Payer: MEDICARE

## 2024-01-30 PROCEDURE — 97110 THERAPEUTIC EXERCISES: CPT | Performed by: PHYSICAL THERAPY ASSISTANT

## 2024-01-30 PROCEDURE — 97112 NEUROMUSCULAR REEDUCATION: CPT | Performed by: PHYSICAL THERAPY ASSISTANT

## 2024-01-30 NOTE — FLOWSHEET NOTE
The [facility] [place],  Sports Performance and Rehabilitation, [address] [address]  Phone: [phone]  Fax: [phone]        Date:  2024    Patient Name:  Melvin Morrow    :  1955  MRN: [id number]  Restrictions/Precautions:    Medical/Treatment Diagnosis Information:  Arthrofibrosis of total knee arthroplasty, subsequent encounter [T84.82XD]  Treatment Diagnosis: M25.561 pain in right knee  Right knee SAIMA/medial-lateral retinacular release/CHASIDY 23  Insurance/Certification information:  PT Insurance Information: Medicare  Physician Information:  Xavier Johnson   Has the plan of care been signed (Y/N):        [x]  Yes  []  No     Date of Patient follow up with Physician: 23 Dr Johnson,  23 Dr. Valle    Patient seen in consultation with Dr. Johnson who established the initial/subsequent treatment protocol.   23: seen by Pj: aspiration performed today.  Pt to hold on Eliquis for 2 days.  Use double compression for edema control.  Limit ROM to approx. 90 degrees for 2 weeks.  Full ROM by 4 weeks.  23: seen by Dr Coffey to resume Eliquis.  Continue using double compression for edema control.  23: seen in MD office: aspiration    Is this a POC Report:     [x]  Yes  []  No        If Yes:  Date Range for reporting period:  Beginnin23  Endin24    POC report will be due (10 Rx or 30 days whichever is less): 24      Visit # Insurance Allowable Auth Required   36 medicare []  Yes [x]  No          OUTCOME MEASURE DATE SCORE   LEFS 23 62% deficit   LEFS 23 86% deficit   LEFS 23 64% deficit   LEFS 23 59% deficit   LEFS 24 67% deficit            Latex Allergy:  [x]NO      []YES  Preferred Language for Healthcare:   [x]English       []other:    Pain level:  4-5/10 infrequent medial knee pain     SUBJECTIVE:  Patient reports knee pain has

## 2024-02-01 ENCOUNTER — APPOINTMENT (OUTPATIENT)
Dept: PHYSICAL THERAPY | Age: 69
End: 2024-02-01
Payer: MEDICARE

## 2024-02-06 ENCOUNTER — HOSPITAL ENCOUNTER (OUTPATIENT)
Dept: PHYSICAL THERAPY | Age: 69
Setting detail: THERAPIES SERIES
Discharge: HOME | End: 2024-02-06
Payer: MEDICARE

## 2024-02-06 PROCEDURE — 97110 THERAPEUTIC EXERCISES: CPT | Performed by: PHYSICAL THERAPY ASSISTANT

## 2024-02-06 PROCEDURE — 97112 NEUROMUSCULAR REEDUCATION: CPT | Performed by: PHYSICAL THERAPY ASSISTANT

## 2024-02-06 NOTE — FLOWSHEET NOTE
balance, coordination, kinesthetic sense, posture, motor skill, proprioception of core, proximal hip and LE for self care, mobility, lifting, and ambulation/stair navigation      Manual Treatments:  PROM / STM / Oscillations-Mobs:  G-I, II, III, IV (PA's, Inf., Post.)  [] (94101) Provided manual therapy to mobilize LE, proximal hip and/or LS spine soft tissue/joints for the purpose of modulating pain, promoting relaxation,  increasing ROM, reducing/eliminating soft tissue swelling/inflammation/restriction, improving soft tissue extensibility and allowing for proper ROM for normal function with self care, mobility, lifting and ambulation.     Modalities:   Charges:   Timed Code Treatment Minutes: 44'   Total Treatment Minutes: 1:00-2:28  88'       [] EVAL (LOW) 90623 (typically 20 minutes face-to-face)  [] EVAL (MOD) 56160 (typically 30 minutes face-to-face)  [] EVAL (HIGH) 39453 (typically 45 minutes face-to-face)  [] RE-EVAL   [x] TE(71115) x 2     [] IONTO  [x] NMR (41607) x  1    [] VASO  [] Manual (92471) x     [] Other:  [] TA x       [] Mech Traction (33025)  [] ES(attended) (01137)      [] ES (un) (71235): Hi-volt 15'    GOALS: 1/25/24    Patient stated goal: return to walking for exercise and hunting without pain/dysfunction.    [x] Progressing: [] Met: [x] Not Met: [] Adjusted     Therapist goals for Patient:   Short Term Goals: To be achieved in: 2 weeks  1. Independent in HEP and progression per patient tolerance, in order to prevent re-injury.     [] Progressing: [x] Met: [] Not Met: [] Adjusted   2. Patient will have a decrease in pain to facilitate improvement in movement, function, and ADLs as indicated by Functional Deficits.    [] Progressing: [x] Met: [] Not Met: [] Adjusted     Long Term Goals: To be achieved in:  8 weeks  1. Disability index score of <25% deficit on LEFS to assist with reaching prior level of function.  [x] Progressing: [] Met: [x] Not Met: [] Adjusted  2. Patient will

## 2024-02-08 ENCOUNTER — APPOINTMENT (OUTPATIENT)
Dept: PHYSICAL THERAPY | Age: 69
End: 2024-02-08
Payer: MEDICARE

## 2024-02-13 ENCOUNTER — OFFICE VISIT (OUTPATIENT)
Dept: ORTHOPEDIC SURGERY | Age: 69
End: 2024-02-13
Payer: MEDICARE

## 2024-02-13 ENCOUNTER — APPOINTMENT (OUTPATIENT)
Dept: PHYSICAL THERAPY | Age: 69
End: 2024-02-13
Payer: MEDICARE

## 2024-02-13 VITALS — HEIGHT: 69 IN | BODY MASS INDEX: 30.66 KG/M2 | WEIGHT: 207 LBS

## 2024-02-13 DIAGNOSIS — M24.661 ARTHROFIBROSIS OF KNEE JOINT, RIGHT: Primary | ICD-10-CM

## 2024-02-13 DIAGNOSIS — Z96.651 S/P RIGHT UNICOMPARTMENTAL KNEE REPLACEMENT: ICD-10-CM

## 2024-02-13 PROCEDURE — 1124F ACP DISCUSS-NO DSCNMKR DOCD: CPT | Performed by: ORTHOPAEDIC SURGERY

## 2024-02-13 PROCEDURE — G8427 DOCREV CUR MEDS BY ELIG CLIN: HCPCS | Performed by: ORTHOPAEDIC SURGERY

## 2024-02-13 PROCEDURE — 1036F TOBACCO NON-USER: CPT | Performed by: ORTHOPAEDIC SURGERY

## 2024-02-13 PROCEDURE — 3017F COLORECTAL CA SCREEN DOC REV: CPT | Performed by: ORTHOPAEDIC SURGERY

## 2024-02-13 PROCEDURE — 99214 OFFICE O/P EST MOD 30 MIN: CPT | Performed by: ORTHOPAEDIC SURGERY

## 2024-02-13 PROCEDURE — G8417 CALC BMI ABV UP PARAM F/U: HCPCS | Performed by: ORTHOPAEDIC SURGERY

## 2024-02-13 PROCEDURE — G8484 FLU IMMUNIZE NO ADMIN: HCPCS | Performed by: ORTHOPAEDIC SURGERY

## 2024-02-13 NOTE — PROGRESS NOTES
Chief Complaint    Knee Pain (Ck right knee)      History of Present Illness:  Melvin Morrow is a 68 y.o. male who presents for follow up of right knee(s). Patient has extensive history of treatment on his right knee which includes a medial unicompartmental knee arthoplasty 12/8/2022 in [place] and anterior synovectomy and extensor mechanism realignment on 8/28/2023 by Dr. Johnson. Patient experienced hematoma of his knee following surgery that required an aspiration on 8/29/2023 and 9/5/2023.     Patient presents today with a chief complaint of not being able to walk more than 1/4 mile or 15 minutes. He complains of sharp medial based knee pain. Patient denies any lateral pain at this visit. He has received two PRP injections from Dr. Valle on 12/21/23 and 1/4/24. He reports the first injection helped significantly but the second one was not as effective. His current motion is about 0-125. He has previously advocated for a patellofemoral replacement and possibly a total joint replacement. Patient would like to lose weight before this procedure in order to improve his chance at a successful outcome.    Patient also has a history of PE and DVT. He had an episode about 3-4 years ago that resulted in submassive PE following a shoulder surgery. He also takes testosterone therapy that increases his risk of clots. He is currently on Eliquis and gets phlebotomy done every 2-3 months    Pain Assessment:  Location: right  Level: 8 out of 10 when walking, 0 out of 10 at rest  Duration: Days after walking  Description: sharp  Result of an injury: No  Work related injury: No  Aggravating actions: walking  Relieving Factors: rest, heat, and OTC medications       Past Medical History:   Diagnosis Date    A-fib (HCC)     Arthritis     CAD (coronary artery disease)     PT SAYS HAS BLOCKAGES, NEVER STENTED    Chronic prostatitis     Diabetes mellitus (HCC)     PRE    Heart disease     Hx of blood clots     4 CLOTS LUNG &

## 2024-02-15 ENCOUNTER — APPOINTMENT (OUTPATIENT)
Dept: PHYSICAL THERAPY | Age: 69
End: 2024-02-15
Payer: MEDICARE

## 2024-05-21 ENCOUNTER — OFFICE VISIT (OUTPATIENT)
Dept: ORTHOPEDIC SURGERY | Age: 69
End: 2024-05-21
Payer: MEDICARE

## 2024-05-21 VITALS — BODY MASS INDEX: 30.66 KG/M2 | WEIGHT: 207 LBS | HEIGHT: 69 IN

## 2024-05-21 DIAGNOSIS — T84.82XD ARTHROFIBROSIS OF TOTAL KNEE ARTHROPLASTY, SUBSEQUENT ENCOUNTER: Primary | ICD-10-CM

## 2024-05-21 DIAGNOSIS — M24.661 ARTHROFIBROSIS OF KNEE JOINT, RIGHT: ICD-10-CM

## 2024-05-21 DIAGNOSIS — Z96.651 S/P RIGHT UNICOMPARTMENTAL KNEE REPLACEMENT: ICD-10-CM

## 2024-05-21 PROCEDURE — G8417 CALC BMI ABV UP PARAM F/U: HCPCS | Performed by: ORTHOPAEDIC SURGERY

## 2024-05-21 PROCEDURE — 99213 OFFICE O/P EST LOW 20 MIN: CPT | Performed by: ORTHOPAEDIC SURGERY

## 2024-05-21 PROCEDURE — G8428 CUR MEDS NOT DOCUMENT: HCPCS | Performed by: ORTHOPAEDIC SURGERY

## 2024-05-21 PROCEDURE — 3017F COLORECTAL CA SCREEN DOC REV: CPT | Performed by: ORTHOPAEDIC SURGERY

## 2024-05-21 PROCEDURE — 1124F ACP DISCUSS-NO DSCNMKR DOCD: CPT | Performed by: ORTHOPAEDIC SURGERY

## 2024-05-21 PROCEDURE — 1036F TOBACCO NON-USER: CPT | Performed by: ORTHOPAEDIC SURGERY

## 2024-05-21 NOTE — PROGRESS NOTES
Palpation: There is no patellofemoral crepitus, there is no significant tenderness over the knee     Range of Motion:  0 to 130  and grossly intact without pain and/or difficulty    Strength:  Motor is grossly intact    Special Tests: There is no ligament instability.     Gait: non antalgic  without the use of assistive devices    Alignment: neutral    Radiology:       AMB REFERRAL TO ORTHOPEDIC SURGERY      Office Procedures:  Orders Placed This Encounter   Procedures    Lamine Charlton MD, Orthopedic Primary Care Sports Medicine (Shoulder; Hip; Knee), [facility]     Referral Priority:   Routine     Referral Type:   Eval and Treat     Referral Reason:   Specialty Services Required     Requested Specialty:   Orthopedic Surgery     Number of Visits Requested:   1            Assessment: Melvin Morrow is a 69 y.o. male who presents for follow up of right knee(s).    Encounter Diagnoses   Name Primary?    Arthrofibrosis of total knee arthroplasty, subsequent encounter Yes    Arthrofibrosis of knee joint, right     S/P right unicompartmental knee replacement        Patient's workup and evaluation were reviewed with the patient in the office today.  Imaging was also reviewed with the patient in the clinic today. Patient is still experiencing medial knee pain that inhibits his ability to walk long distance, which needs to be addressed. He has no crepitus in his patella and has good patellar mobility. No evidence of any joint swelling. No evidence of nerve tenderness. Patient does experience pain with palpation to medial retinaculum. We agree that PRP injections would be a reasonable course of action to address pain and loss of function. Referral will be sent for Dr. Valle to repeat PRP injections. If these injections do not provide relief, we will explore other treatment options.    Treatment Plan:    Activity modification and rest  Ice 20 minutes every 1-2 hours PRN  NSAIDs PRN  Elevation at

## 2024-06-24 ENCOUNTER — TELEPHONE (OUTPATIENT)
Dept: ORTHOPEDIC SURGERY | Age: 69
End: 2024-06-24

## 2024-06-24 NOTE — TELEPHONE ENCOUNTER
General Question     Subject: PRP INJ RESCHED   Patient and /or Facility Request: Melvin Morrow   Contact Number: [phone]   ALTERNATIVE PH: [phone]       PT CALLING DUE TO NEEDING TO R/S HIS PRP INJ WITH DR TERRELL.     PLEASE CALL BACK PT AT THE ABOVE NUMBER LISTED

## 2024-07-09 ENCOUNTER — TELEPHONE (OUTPATIENT)
Dept: ORTHOPEDIC SURGERY | Age: 69
End: 2024-07-09

## 2024-07-10 NOTE — TELEPHONE ENCOUNTER
S/W pt to reschedule PRP for Monday 9/16.  Pt stated that rescheduling was due to his hemoglobin being way too high (up to 17.8), which was due to him taking testosterone, and not checking his hemoglobin levels regularly like he should, and wants to wait until it is under control again.    I let the pt know that there is now a \"high-dose\" Endoret PRP and it is just one injection, as opposed to the 2 injections previously.  Cost for the new high dose Endoret is $530 (per DME manager).  Pt v/u but would like to know if he could still get 2 PRP injections in the knee.  I advised that I would have to let Dr. Valle answer that.

## 2024-08-05 ENCOUNTER — TELEPHONE (OUTPATIENT)
Dept: ORTHOPEDIC SURGERY | Age: 69
End: 2024-08-05

## 2024-08-05 NOTE — TELEPHONE ENCOUNTER
General Question     Subject: PRP INJ  Patient and /or Facility Request: Melvin Morrow   Contact Number: [phone]     PT REQ TO ADD L HIP FOR PRP INJ AT SAME TIME AS R KNEE    PLEASE CLL ABOVE PHN NUMBER TO ADV

## 2024-08-12 ENCOUNTER — TELEPHONE (OUTPATIENT)
Dept: ORTHOPEDIC SURGERY | Age: 69
End: 2024-08-12

## 2024-08-26 ENCOUNTER — TELEPHONE (OUTPATIENT)
Dept: ORTHOPEDIC SURGERY | Age: 69
End: 2024-08-26

## 2024-08-26 NOTE — TELEPHONE ENCOUNTER
Surgery and/or Procedure Scheduling     Contact Name: Morrow, Melvin ESPINOSA   Surgical/Procedure Request: injection  Patient Contact Number: [phone]     This patient would like to see if he can get this injection rescheduled for 09/23.  Please call him at the above #

## 2024-10-07 ENCOUNTER — TELEPHONE (OUTPATIENT)
Dept: ORTHOPEDIC SURGERY | Age: 69
End: 2024-10-07

## 2024-10-07 NOTE — TELEPHONE ENCOUNTER
General Question     Subject: RESCHEDULE APPT  Patient and /or Facility Request: Melvin Morrow   Contact Number: [phone]     PATIENT CALLED IN TO CANCEL APPT. AND WOULD LIKE TO RESCHEDULE HIS APPT. WIFE HAS COVID...    PATIENT REQ A CALL BACK...    PLEASE ADVISE

## 2024-10-11 NOTE — TELEPHONE ENCOUNTER
Pt has been rescheduled.  I advised him of the high dose PRP being no longer used at this time due to  issue, and pt states that he is still interested, though I told him we do not yet have a projected date for HD PRP to return.  Pt v/u and states that if it does become available around the time of his December appts (or shortly after), then he would like to switch to that.  I told pt that I would contact him if so.

## 2024-11-12 ENCOUNTER — TELEPHONE (OUTPATIENT)
Dept: ORTHOPEDIC SURGERY | Age: 69
End: 2024-11-12

## 2024-11-12 NOTE — TELEPHONE ENCOUNTER
General Question     Subject: PRP SINGLE SHOT   Patient and /or Facility Request: Melvin Morrow   Contact Number: [phone]     DOES THE Pt NEED THE 2 INJECTIONS? IS THE SINGLE SHOT AVAILABLE?  PLEASE CALL TO ADVISE.

## 2024-11-26 ENCOUNTER — TELEPHONE (OUTPATIENT)
Dept: ORTHOPEDIC SURGERY | Age: 69
End: 2024-11-26

## 2024-11-26 NOTE — TELEPHONE ENCOUNTER
Other PATIENT IS REQUESTING A CALL BACK TO RESCHEDULE 12/2 PRP INJECTION. HE HAS A MERVIN INJECTION WITH UC 12/4AND CAN NOT BE ELIQUIS FOR BOTH DAYS. [phone]

## 2025-02-03 ENCOUNTER — PROCEDURE VISIT (OUTPATIENT)
Dept: ORTHOPEDIC SURGERY | Age: 70
End: 2025-02-03

## 2025-02-03 VITALS — BODY MASS INDEX: 30.66 KG/M2 | HEIGHT: 69 IN | WEIGHT: 207 LBS

## 2025-02-03 DIAGNOSIS — Z96.651 S/P RIGHT UNICOMPARTMENTAL KNEE REPLACEMENT: ICD-10-CM

## 2025-02-03 DIAGNOSIS — M17.11 PRIMARY OSTEOARTHRITIS OF RIGHT KNEE: ICD-10-CM

## 2025-02-03 DIAGNOSIS — Z79.01 CHRONIC ANTICOAGULATION: ICD-10-CM

## 2025-02-03 DIAGNOSIS — T84.82XD ARTHROFIBROSIS OF TOTAL KNEE ARTHROPLASTY, SUBSEQUENT ENCOUNTER: Primary | ICD-10-CM

## 2025-02-03 NOTE — PROGRESS NOTES
Chief Complaint:   Chief Complaint   Patient presents with    Knee Pain     Right Knee - PRP Arthrex new machine.  WOMAC - 38          History of Present Illness:       Patient is a 69 y.o. male returns follow up for the above complaint. The patient was last seen approximately 1 yearsago. The symptoms are resurfacing since the last visit. The patient has had no further testing for the problem.      Past orthopedic history significant for arthrofibrosis status post medial compartment UKA.     OPERATIVE PROCEDURES: Performed 8/28/2023  1.  Anterior open synovectomy, right knee (CPT code 24925).  2.  Extensor mechanism realignment medial and lateral retinacular  releases and MPFL repair, right knee.  CPT code 37538.  3.  Manipulation, right knee.  CPT 61980.    Status post [id number] injection x 2 completed on 1/4/2024    He would like to proceed with PRP injection as previously discussed    Arthrex ACP Max system    W0 MAC 38     Past Medical History:        Past Medical History:   Diagnosis Date    A-fib (HCC)     Arthritis     CAD (coronary artery disease)     PT SAYS HAS BLOCKAGES, NEVER STENTED    Chronic prostatitis     Diabetes mellitus (HCC)     PRE    Heart disease     Hx of blood clots     4 CLOTS LUNG & LEG    Hyperlipidemia     Hypertension     Polycythemia     DOES PHLEBOTOMY EVERY MONTH & BEFORE SURGERIES, PT SCHEDULED TO HAVE 8/25/23 DR MISTY HINKLE    Sleep apnea     CAN;T TOLERATE CPAP    SOB (shortness of breath) on exertion     \"RUNNERS ARRYTHMIA\"        Present Medications:         Current Outpatient Medications   Medication Sig Dispense Refill    tadalafil (CIALIS) 20 MG tablet       nitroGLYCERIN (NITROSTAT) 0.4 MG SL tablet PT SAYS HAS, NEVER USED, PRN      methocarbamol (ROBAXIN) 500 MG tablet Take 1 tablet by mouth as needed      colesevelam (WELCHOL) 625 MG tablet TAKE 3 TABLETS BY MOUTH TWICE DAILY      testosterone cypionate (DEPOTESTOTERONE CYPIONATE) 200 MG/ML injection INJECT

## 2025-02-21 ENCOUNTER — TELEPHONE (OUTPATIENT)
Dept: ORTHOPEDIC SURGERY | Age: 70
End: 2025-02-21

## 2025-02-21 NOTE — TELEPHONE ENCOUNTER
He seems to be doing OK. He's not having any pain. It def feels like it's getting better. Some soreness after but it went away fast.  He would like to do the other side of the knee on March 31st. He doesn't want to end up having to keep getting shots on the other side of that same knee.

## 2025-05-02 ENCOUNTER — TELEPHONE (OUTPATIENT)
Dept: ORTHOPEDIC SURGERY | Age: 70
End: 2025-05-02

## 2025-05-02 NOTE — TELEPHONE ENCOUNTER
----- Message from Deanna GRANGER sent at 5/1/2025  3:33 PM EDT -----  Specialty Message to Provider    Relationship to Patient: Self     Patient Message: THE PT WANTS SOMEONE FROM DR TERRELL'S OFFICE TO CALL HIM BACK REGARDING A BILL THAT HE RECEIVED FOR A SCANNING DEVICE.  DR TERRELL TOLD HIM HE WOULDN'T BE BILLED FOR IT BECAUSE HE WAS JUST LEARNING HOW TO USE IT.  PLEASE CALL THE PT BACK AT THE BELOW #  --------------------------------------------------------------------------------------------------------------------------    Call Back Information: OK to leave message on voicemail  Preferred Call Back Number:  [phone]

## 2025-05-02 NOTE — TELEPHONE ENCOUNTER
Pt received a bill for the PRP ACP Max, and this was to be one of the kits that was gratis. I believe this was just to be a standard office visit, is that correct?  Please advise.
